# Patient Record
Sex: FEMALE | Race: WHITE | URBAN - NONMETROPOLITAN AREA
[De-identification: names, ages, dates, MRNs, and addresses within clinical notes are randomized per-mention and may not be internally consistent; named-entity substitution may affect disease eponyms.]

---

## 2022-02-16 ENCOUNTER — OFFICE VISIT (OUTPATIENT)
Dept: ENT CLINIC | Age: 62
End: 2022-02-16
Payer: MEDICAID

## 2022-02-16 VITALS
WEIGHT: 158 LBS | HEIGHT: 62 IN | SYSTOLIC BLOOD PRESSURE: 138 MMHG | BODY MASS INDEX: 29.08 KG/M2 | DIASTOLIC BLOOD PRESSURE: 80 MMHG

## 2022-02-16 DIAGNOSIS — E05.90 HYPERTHYROIDISM: ICD-10-CM

## 2022-02-16 DIAGNOSIS — E04.1 THYROID NODULE: Primary | ICD-10-CM

## 2022-02-16 DIAGNOSIS — R13.14 PHARYNGOESOPHAGEAL DYSPHAGIA: ICD-10-CM

## 2022-02-16 PROCEDURE — 31575 DIAGNOSTIC LARYNGOSCOPY: CPT | Performed by: PHYSICIAN ASSISTANT

## 2022-02-16 PROCEDURE — G8419 CALC BMI OUT NRM PARAM NOF/U: HCPCS | Performed by: PHYSICIAN ASSISTANT

## 2022-02-16 PROCEDURE — 99203 OFFICE O/P NEW LOW 30 MIN: CPT | Performed by: PHYSICIAN ASSISTANT

## 2022-02-16 PROCEDURE — G8484 FLU IMMUNIZE NO ADMIN: HCPCS | Performed by: PHYSICIAN ASSISTANT

## 2022-02-16 PROCEDURE — 4004F PT TOBACCO SCREEN RCVD TLK: CPT | Performed by: PHYSICIAN ASSISTANT

## 2022-02-16 PROCEDURE — G8427 DOCREV CUR MEDS BY ELIG CLIN: HCPCS | Performed by: PHYSICIAN ASSISTANT

## 2022-02-16 PROCEDURE — 3017F COLORECTAL CA SCREEN DOC REV: CPT | Performed by: PHYSICIAN ASSISTANT

## 2022-02-16 RX ORDER — HYDROCHLOROTHIAZIDE 12.5 MG/1
12.5 CAPSULE, GELATIN COATED ORAL DAILY
COMMUNITY

## 2022-02-16 RX ORDER — AMLODIPINE BESYLATE 10 MG/1
10 TABLET ORAL DAILY
COMMUNITY

## 2022-02-16 RX ORDER — PAROXETINE 10 MG/1
TABLET, FILM COATED ORAL
COMMUNITY
Start: 2022-02-13

## 2022-02-16 RX ORDER — METOPROLOL SUCCINATE 25 MG/1
25 TABLET, EXTENDED RELEASE ORAL DAILY
COMMUNITY

## 2022-02-16 RX ORDER — ALBUTEROL SULFATE 90 UG/1
2 AEROSOL, METERED RESPIRATORY (INHALATION) EVERY 6 HOURS PRN
COMMUNITY

## 2022-02-16 RX ORDER — PANTOPRAZOLE SODIUM 40 MG/1
TABLET, DELAYED RELEASE ORAL
COMMUNITY
Start: 2022-01-25

## 2022-02-16 RX ORDER — ATORVASTATIN CALCIUM 40 MG/1
40 TABLET, FILM COATED ORAL DAILY
COMMUNITY

## 2022-02-16 RX ORDER — CHOLECALCIFEROL (VITAMIN D3) 1250 MCG
CAPSULE ORAL
COMMUNITY

## 2022-02-16 RX ORDER — CLOPIDOGREL BISULFATE 75 MG/1
75 TABLET ORAL DAILY
COMMUNITY

## 2022-02-16 ASSESSMENT — ENCOUNTER SYMPTOMS
EYE PAIN: 0
SINUS PAIN: 0
EYE DISCHARGE: 0
SINUS PRESSURE: 0
TROUBLE SWALLOWING: 0
FACIAL SWELLING: 0
VOICE CHANGE: 1
SORE THROAT: 0
RHINORRHEA: 0

## 2022-02-16 NOTE — ASSESSMENT & PLAN NOTE
Hyperthyroidism based on current lab with history of thyroid dysfunction in the past requiring medication

## 2022-02-16 NOTE — ASSESSMENT & PLAN NOTE
Questionable thyroid nodule at the isthmus with evidence of thyroiditislikely from hyperthyroidism  Plan: I have recommended repeating an ultrasound in 6 months after the thyroid lab has been corrected. Overall I believe that the thyroiditis should resolve with the medications. We will defer her thyroid medicine to [facility] -her PCP.

## 2022-02-16 NOTE — ASSESSMENT & PLAN NOTE
Dysphagiaquestion secondary to possible esophageal stricture due to negative laryngoscopy with no extrinsic compression from thyroid

## 2022-02-16 NOTE — PROGRESS NOTES
[facility] OTOLARYNGOLOGY/ENT  Ms. Robert Herrera is a pleasant 79-year-old  female that was referred by [facility] due to problems with a questionable thyroid nodule and possible evidence of thyroiditis. The patient reports that she has been feeling very draggy with no energy. She reports that when she lived in [place] she was taking thyroid medicine and decided that she did not want to take it anymore. Patient underwent recent lab that demonstrated a decreased TSH level of less than 0.005. Free T4 was noted be within normal range at 1.79. Ultrasound of the thyroid was reviewed and demonstrated no obvious dominant nodules but was noted to be questionable for thyroiditis. Based on her lab work this is probably related to her thyroid disease. Due to the questionable nodule on the isthmus a follow-up was recommended. She denies any personal radiation exposure and denies a family history of thyroid cancer. She admits to a family history of thyroid dysfunction to include her sister. Patient also is noted with complaints of dysphagia that occurs sporadically with solid foods. She also admits to vocal hoarseness that she notices on a daily basis. She admits to smoking a pack of cigarettes/day for multiple years.       Allergies: Codeine      Current Outpatient Medications   Medication Sig Dispense Refill    metoprolol succinate (TOPROL XL) 25 MG extended release tablet Take 25 mg by mouth daily      pantoprazole (PROTONIX) 40 MG tablet       Cholecalciferol (VITAMIN D3) 1.25 MG (41638 UT) CAPS Take by mouth      PARoxetine (PAXIL) 10 MG tablet       hydroCHLOROthiazide (MICROZIDE) 12.5 MG capsule Take 12.5 mg by mouth daily      clopidogrel (PLAVIX) 75 MG tablet Take 75 mg by mouth daily      amLODIPine (NORVASC) 10 MG tablet Take 10 mg by mouth daily      atorvastatin (LIPITOR) 40 MG tablet Take 40 mg by mouth daily      tiotropium-olodaterol (STIOLTO RESPIMAT) 2.5-2.5 MCG/ACT AERS Inhale 2 puffs into the lungs daily      albuterol sulfate HFA (VENTOLIN HFA) 108 (90 Base) MCG/ACT inhaler Inhale 2 puffs into the lungs every 6 hours as needed for Wheezing       No current facility-administered medications for this visit. Past Surgical History:   Procedure Laterality Date    CHOLECYSTECTOMY         Past Medical History:   Diagnosis Date    [facility]est[facility]     Hyperlipidemia     Hypertension     Lung disease     [facility]        Family History   Problem Relation Age of Onset    Cancer Mother     Thyroid Disease Sister        Social History     Tobacco Use    Smoking status: Current Every Day Smoker     Packs/day: 0.50    Smokeless tobacco: Never Used   Substance Use Topics    Alcohol use: Not Currently           REVIEW OF SYSTEMS:  all other systems reviewed and are negative  Review of Systems   Constitutional: Negative for chills and fever. HENT: Positive for voice change. Negative for congestion, dental problem, ear discharge, ear pain, facial swelling, hearing loss, nosebleeds, postnasal drip, rhinorrhea, sinus pressure, sinus pain, sneezing, sore throat, tinnitus and trouble swallowing. Eyes: Negative for pain and discharge. Neurological: Negative for dizziness and headaches. Comments:     PHYSICAL EXAM:    /80   Ht 5' 2\" (1.575 m)   Wt 158 lb (71.7 kg)   BMI 28.90 kg/m²   Body mass index is 28.9 kg/m².     General Appearance: well developed  and well nourished  Head/ Face: normocephalic and atraumatic  Vocal Quality: good/ normal  Ears: Right Ear: External: external ears normal Otoscopy Ear Canal: canal clear Otoscopy TM: TM's normal and TM's mobile Left Ear: External: external ears normal Otoscopy Ear Canal: canal clear Otoscopy TM: TM's normal and TM's mobile  Hearing: grossly intact  Nose: see endoscopy report  Neck: supple and adenopathy none palpable  Thyroid: normal and nodules No thyroid tenderness was appreciated to epiglottis where the vocal cords were well visualized. The vocal cords were symmetrical and fully functional with no nodularities. The vocal cords were mildly edematous and erythematous and was felt to be secondary to mild reflux disease. Compression of the thyroid demonstrated no extrinsic compression to be present. The tongue was extended and demonstrated no lesions to the surface. No additional findings were appreciated. The patient tolerated the procedure nicely with no complications. No orders of the defined types were placed in this encounter. Electronically signed by Joe Reynoso PA-C on 2/16/22 at 1:17 PM CST        Please note that this chart was generated using dragon dictation software. Although every effort was made to ensure the accuracy of this automated transcription, some errors in transcription may have occurred.

## 2022-03-18 ENCOUNTER — TELEPHONE (OUTPATIENT)
Dept: VASCULAR SURGERY | Facility: CLINIC | Age: 62
End: 2022-03-18

## 2022-03-21 ENCOUNTER — OFFICE VISIT (OUTPATIENT)
Dept: VASCULAR SURGERY | Facility: CLINIC | Age: 62
End: 2022-03-21

## 2022-03-21 VITALS
DIASTOLIC BLOOD PRESSURE: 86 MMHG | SYSTOLIC BLOOD PRESSURE: 130 MMHG | WEIGHT: 151 LBS | BODY MASS INDEX: 27.79 KG/M2 | RESPIRATION RATE: 18 BRPM | HEIGHT: 62 IN

## 2022-03-21 DIAGNOSIS — I10 ESSENTIAL HYPERTENSION: ICD-10-CM

## 2022-03-21 DIAGNOSIS — I87.2 EDEMA OF BOTH LOWER LEGS DUE TO PERIPHERAL VENOUS INSUFFICIENCY: ICD-10-CM

## 2022-03-21 DIAGNOSIS — E78.2 MIXED HYPERLIPIDEMIA: ICD-10-CM

## 2022-03-21 DIAGNOSIS — F17.200 SMOKER UNMOTIVATED TO QUIT: ICD-10-CM

## 2022-03-21 DIAGNOSIS — R60.0 EDEMA OF BOTH LOWER LEGS DUE TO PERIPHERAL VENOUS INSUFFICIENCY: ICD-10-CM

## 2022-03-21 DIAGNOSIS — I87.2 EDEMA OF BOTH LOWER EXTREMITIES DUE TO PERIPHERAL VENOUS INSUFFICIENCY: Primary | ICD-10-CM

## 2022-03-21 PROBLEM — E05.90 HYPERTHYROIDISM: Status: ACTIVE | Noted: 2018-08-03

## 2022-03-21 PROBLEM — R13.14 PHARYNGOESOPHAGEAL DYSPHAGIA: Status: ACTIVE | Noted: 2022-02-16

## 2022-03-21 PROCEDURE — 99204 OFFICE O/P NEW MOD 45 MIN: CPT | Performed by: SURGERY

## 2022-03-21 RX ORDER — HYDROCHLOROTHIAZIDE 12.5 MG/1
12.5 TABLET ORAL DAILY
COMMUNITY
Start: 2022-03-14 | End: 2022-05-11

## 2022-03-21 RX ORDER — FUROSEMIDE 40 MG/1
40 TABLET ORAL 2 TIMES DAILY
COMMUNITY
Start: 2022-03-02

## 2022-03-21 RX ORDER — TIOTROPIUM BROMIDE AND OLODATEROL 3.124; 2.736 UG/1; UG/1
2 SPRAY, METERED RESPIRATORY (INHALATION) DAILY
COMMUNITY
Start: 2022-03-08 | End: 2022-12-06 | Stop reason: ALTCHOICE

## 2022-03-21 RX ORDER — CLOPIDOGREL BISULFATE 75 MG/1
1 TABLET ORAL DAILY
COMMUNITY
Start: 2022-03-14

## 2022-03-21 RX ORDER — ATORVASTATIN CALCIUM 40 MG/1
1 TABLET, FILM COATED ORAL NIGHTLY
COMMUNITY
Start: 2022-01-14

## 2022-03-21 RX ORDER — ALBUTEROL SULFATE 90 UG/1
2 AEROSOL, METERED RESPIRATORY (INHALATION) EVERY 6 HOURS PRN
COMMUNITY
End: 2022-12-06 | Stop reason: SDUPTHER

## 2022-03-21 RX ORDER — PAROXETINE 10 MG/1
1 TABLET, FILM COATED ORAL DAILY
COMMUNITY
Start: 2022-02-13

## 2022-03-21 RX ORDER — PANTOPRAZOLE SODIUM 40 MG/1
1 TABLET, DELAYED RELEASE ORAL DAILY
COMMUNITY
Start: 2022-01-25

## 2022-03-21 RX ORDER — POTASSIUM CHLORIDE 1500 MG/1
20 TABLET, FILM COATED, EXTENDED RELEASE ORAL DAILY
COMMUNITY
Start: 2022-03-16

## 2022-03-21 NOTE — PROGRESS NOTES
03/21/2022      Ruma Jenkins APRN  [address] [place] [address]    Lita Sparks  1960    Chief Complaint   Patient presents with   • Establish Care     Referral by Ruma NEGRON for Raynaud's Syndrome without gangrene.       Dear WILBER Flanagan*:      HPI  I had the pleasure of seeing your patient Lita Sparks in the office today.  Thank you kindly for this consultation.  As you recall, Lita Sparks is a 61 y.o.  female who you are currently following for general medical care.  She is referred to the vascular surgery office today for lower extremity edema and some venous congestion/purplish discoloration of the toes.  She has a history of COPD and CHF, as well as hypertension and hyperlipidemia.  She notes recently she has been having increasing shortness of breath with exertion and had previously been following with a cardiologist who had discussed possible cardiac cath but she never had it done.  She has been living here in [place] for about 7 months.  She actually now does have evaluation by our cardiology team here at [facility] in a couple of days.  She notes over the last several months she has been having worsening lower extremity edema that is worse with prolonged standing and somewhat improved with leg elevation and lying supine.  She also notes that her legs feel heavy with prolonged standing.  Finally she also notes developing some purplish discoloration to the toes of the bilateral feet especially with prolonged standing which is also improved with leg elevation and lying supine.  She denies any arterial symptoms with no claudication or ischemic rest pain.  She denies any prior history of DVT.  She has not really worn any compression in the past.  She has no other acute complaints today.    Past Medical History:   Diagnosis Date   • COPD (chronic obstructive pulmonary disease) ([facility])    • GERD (gastroesophageal reflux disease)    • Hyperlipemia    • Stroke ([facility])         Past Surgical History:   Procedure Laterality Date   • CHOLECYSTECTOMY         Family History   Problem Relation Age of Onset   • No Known Problems Mother    • No Known Problems Father        Social History     Socioeconomic History   • Marital status:    Tobacco Use   • Smoking status: Current Every Day Smoker     Packs/day: 1.00     Years: 47.00     Pack years: 47.00   • Smokeless tobacco: Never Used   • Tobacco comment: Pt has cut back to .25 pack per day.   Substance and Sexual Activity   • Alcohol use: Never   • Drug use: Never   • Sexual activity: Defer       Allergies   Allergen Reactions   • Codeine Other (See Comments)     Chest pain   • Methimazole Other (See Comments)     N/V       Prior to Admission medications    Medication Sig Start Date End Date Taking? Authorizing Provider   albuterol sulfate  (90 Base) MCG/ACT inhaler Inhale 2 puffs Every 6 (Six) Hours As Needed.   Yes Deonte Garcia MD   atorvastatin (LIPITOR) 40 MG tablet 1 tablet Every Night. 1/14/22  Yes Deonte Garcia MD   cholecalciferol (VITAMIN D3) 1.25 MG (83320 UT) capsule Take 1 capsule by mouth Every 7 (Seven) Days.   Yes Deonte Garcia MD   clopidogrel (PLAVIX) 75 MG tablet 1 tablet Daily. 3/14/22  Yes Deonte Garcia MD   furosemide (LASIX) 40 MG tablet Take 40 mg by mouth 2 (Two) Times a Day. 3/2/22  Yes Deonte Garcia MD   hydroCHLOROthiazide (HYDRODIURIL) 12.5 MG tablet Take 12.5 mg by mouth Daily. 3/14/22  Yes Deonte Garcia MD   metoprolol tartrate (LOPRESSOR) 25 MG tablet Take 12.5 mg by mouth Daily. 3/3/22  Yes Deonte Garcia MD   pantoprazole (PROTONIX) 40 MG EC tablet 1 tablet Daily. 1/25/22  Yes Deonte Garcia MD   PARoxetine (PAXIL) 10 MG tablet 1 tablet Daily. 2/13/22  Yes Deonet Garcia MD   potassium chloride ER (K-TAB) 20 MEQ tablet controlled-release ER tablet Take 20 mEq by mouth Daily. 3/16/22  Yes Provider, Historical, MD   Stiolto  "Respimat 2.5-2.5 MCG/ACT aerosol solution inhaler 2 puffs Daily. 3/8/22  Yes Provider, Historical, MD       Review of Systems   Constitutional: Negative.  Negative for activity change, appetite change, chills, diaphoresis, fatigue and fever.   HENT: Negative.  Negative for congestion, sneezing, sore throat and trouble swallowing.    Eyes: Negative.  Negative for visual disturbance.   Respiratory: Negative.  Negative for chest tightness and shortness of breath.    Cardiovascular: Positive for leg swelling. Negative for chest pain and palpitations.   Gastrointestinal: Negative.  Negative for abdominal distention, abdominal pain, nausea and vomiting.   Endocrine: Negative.    Genitourinary: Negative.    Musculoskeletal: Negative.    Skin: Negative.         She notes some purplish discoloration to the toes and distal forefeet bilaterally when her feet are in a dependent position.  This improves with leg elevation and lying supine.   Allergic/Immunologic: Negative.    Neurological: Negative.    Hematological: Negative.    Psychiatric/Behavioral: Negative.        /86 (BP Location: Left arm, Patient Position: Sitting, Cuff Size: Adult)   Resp 18   Ht 157.5 cm (62\")   Wt 68.5 kg (151 lb)   BMI 27.62 kg/m²   Physical Exam  Vitals reviewed.   Constitutional:       Appearance: Normal appearance.   HENT:      Head: Normocephalic and atraumatic.      Nose: Nose normal.      Mouth/Throat:      Mouth: Mucous membranes are moist.   Eyes:      Extraocular Movements: Extraocular movements intact.      Pupils: Pupils are equal, round, and reactive to light.   Cardiovascular:      Rate and Rhythm: Normal rate and regular rhythm.      Pulses:           Carotid pulses are 2+ on the right side and 2+ on the left side.       Radial pulses are 2+ on the right side and 2+ on the left side.        Femoral pulses are 2+ on the right side and 2+ on the left side.       Popliteal pulses are 2+ on the right side and 2+ on the left side. "        Dorsalis pedis pulses are 2+ on the right side and 2+ on the left side.        Posterior tibial pulses are 2+ on the right side and 2+ on the left side.      Comments: She has some edema of the bilateral lower extremities from ankles to knees.  She does have some venous congestion in the bilateral forefeet and toes.  In my office today this is worse when she sits with her feet dependent, and is improved with her legs elevated.  Pulmonary:      Effort: Pulmonary effort is normal. No respiratory distress.   Abdominal:      General: There is no distension.      Palpations: Abdomen is soft. There is no mass.      Tenderness: There is no abdominal tenderness.   Musculoskeletal:         General: Normal range of motion.      Cervical back: Normal range of motion and neck supple.      Right lower leg: Edema present.      Left lower leg: Edema present.   Skin:     General: Skin is warm and dry.      Capillary Refill: Capillary refill takes less than 2 seconds.   Neurological:      General: No focal deficit present.      Mental Status: She is alert and oriented to person, place, and time.   Psychiatric:         Mood and Affect: Mood normal.         Behavior: Behavior normal.         Thought Content: Thought content normal.         Judgment: Judgment normal.         No results found.    Patient Active Problem List   Diagnosis   • Cough   • Esophageal reflux   • Hyperthyroidism   • Arthritis   • Pharyngoesophageal dysphagia   • Smoker unmotivated to quit   • SOB (shortness of breath)   • Vaginitis and vulvovaginitis         ICD-10-CM ICD-9-CM   1. Edema of both lower extremities due to peripheral venous insufficiency  I87.2 459.81   2. Edema of both lower legs due to peripheral venous insufficiency   I87.2 459.81    R60.0 782.3   3. Essential hypertension  I10 401.9   4. Mixed hyperlipidemia  E78.2 272.2   5. Smoker unmotivated to quit  F17.200 305.1       Lab Frequency Next Occurrence   US Venous Doppler Lower Extremity  Bilateral (duplex) Once 06/19/2022       Plan: After thoroughly evaluating Lita Sparks, I believe the best course of action is to initially remain conservative from a vascular standpoint.  She clinically has evidence of lower extremity venous insufficiency with edema that is worse with prolonged standing and somewhat improved with leg elevation and lying supine.  She also has signs of venous congestion especially in the distal feet which is also worse with prolonged standing and improved with leg elevation and lying supine.  This is likely also exacerbated by CHF. I will give the patient a prescription for compression stockings in the 20-30 mm pressure gradient range.  I did instruct the patient on how to wear these on a daily basis.  I would like her to keep her legs elevated when the is not on them, and keep her legs well moisturized.  We will see the patient back in 3 months with a venous valvular insufficiency study. If the testing does show significant venous reflux, the patient may be a great candidate for endovenous closure as the patient's symptoms have significantly impacted their activities of daily living.  The patient can continue taking their current medication regimen as previously planned. I did discuss vascular risk factors as they pertain to the progression of vascular disease including controlling hypertension, and hyperlipidemia. These factors remain stable. Patient's Body mass index is 27.62 kg/m². indicating that she is overweight (BMI 25-29.9). Patient's (Body mass index is 27.62 kg/m².) indicates that they are overweight with health conditions that include hypertension, dyslipidemias and lower extremity venous stasis disease . Weight is newly identified. BMI is is above average; BMI management plan is completed. We discussed portion control and increasing exercise. This was all discussed in full with complete understanding.    Thank you for allowing me to participate in the care of your  patient.  Please do not hesitate with any questions or concerns.  I will keep you aware of any further encounters with Lita Sparks.        Sincerely yours,         Cade Ko MD

## 2022-03-23 ENCOUNTER — OFFICE VISIT (OUTPATIENT)
Dept: CARDIOLOGY | Facility: CLINIC | Age: 62
End: 2022-03-23

## 2022-03-23 VITALS
HEIGHT: 62 IN | HEART RATE: 99 BPM | SYSTOLIC BLOOD PRESSURE: 130 MMHG | OXYGEN SATURATION: 96 % | DIASTOLIC BLOOD PRESSURE: 90 MMHG | WEIGHT: 156 LBS | BODY MASS INDEX: 28.71 KG/M2

## 2022-03-23 DIAGNOSIS — E05.90 HYPERTHYROIDISM: ICD-10-CM

## 2022-03-23 DIAGNOSIS — Z71.6 TOBACCO ABUSE COUNSELING: ICD-10-CM

## 2022-03-23 DIAGNOSIS — R07.89 CHEST HEAVINESS: ICD-10-CM

## 2022-03-23 DIAGNOSIS — F17.200 SMOKER UNMOTIVATED TO QUIT: ICD-10-CM

## 2022-03-23 DIAGNOSIS — J41.0 SIMPLE CHRONIC BRONCHITIS: ICD-10-CM

## 2022-03-23 DIAGNOSIS — R06.02 SOB (SHORTNESS OF BREATH): Primary | ICD-10-CM

## 2022-03-23 DIAGNOSIS — E87.6 HYPOKALEMIA: ICD-10-CM

## 2022-03-23 DIAGNOSIS — G47.34 OXYGEN DESATURATION DURING SLEEP: ICD-10-CM

## 2022-03-23 DIAGNOSIS — I87.2 VENOUS INSUFFICIENCY: ICD-10-CM

## 2022-03-23 PROCEDURE — 93000 ELECTROCARDIOGRAM COMPLETE: CPT | Performed by: INTERNAL MEDICINE

## 2022-03-23 PROCEDURE — 99204 OFFICE O/P NEW MOD 45 MIN: CPT | Performed by: INTERNAL MEDICINE

## 2022-03-23 NOTE — PROGRESS NOTES
Lita Sparks  [id number]  1960  61 y.o.  female    Referring Provider: Ruma Jenkins APRN    Reason for  Visit:  Initial visit for shortness of breath and abnormal echo   Had echo in [facility]   Was told by Dr Sorenson needs heart cath   She does not understand why as did not get any details   Remote chemical stress test in [place] > 10 years ago, not sure of the results     Subjective    Moderate  chronic exertional shortness of breath on exertion relieved with rest  No significant cough or wheezing    No palpitations  Occasional chest heaviness   Lasts for less than 5 mins     Once or twice a week for > 3 years   Chest pain non pleuritic  Chest pain non positional and non gustatory     No significant pedal edema    No fever or chills  No significant expectoration    No hemoptysis  No presyncope or syncope    Tolerating current medications well with no untoward side effects   Compliant with prescribed medication regimen. Tries to adhere to cardiac diet.       History of present illness:  Lita Sparks is a 61 y.o. yo female with history congestive heart failure  who presents today for   Chief Complaint   Patient presents with   • Establish Care     Patient had a recent echo and [facility] and was told she needs to have a cath done    .    History  Past Medical History:   Diagnosis Date   • COPD (chronic obstructive pulmonary disease) (HCC)    • GERD (gastroesophageal reflux disease)    • Hyperlipemia    • Stroke (HCC)    ,   Past Surgical History:   Procedure Laterality Date   • CHOLECYSTECTOMY     ,   Family History   Problem Relation Age of Onset   • No Known Problems Mother    • No Known Problems Father    ,   Social History     Tobacco Use   • Smoking status: Current Every Day Smoker     Packs/day: 1.00     Years: 47.00     Pack years: 47.00   • Smokeless tobacco: Never Used   • Tobacco comment: Pt has cut back to .25 pack per day.   Substance Use Topics   • Alcohol use: Never   •  "Drug use: Never   ,     Medications  Current Outpatient Medications   Medication Sig Dispense Refill   • albuterol sulfate  (90 Base) MCG/ACT inhaler Inhale 2 puffs Every 6 (Six) Hours As Needed.     • atorvastatin (LIPITOR) 40 MG tablet 1 tablet Every Night.     • cholecalciferol (VITAMIN D3) 1.25 MG (50849 UT) capsule Take 1 capsule by mouth Every 7 (Seven) Days.     • clopidogrel (PLAVIX) 75 MG tablet 1 tablet Daily.     • furosemide (LASIX) 40 MG tablet Take 40 mg by mouth 2 (Two) Times a Day.     • hydroCHLOROthiazide (HYDRODIURIL) 12.5 MG tablet Take 12.5 mg by mouth Daily.     • metoprolol tartrate (LOPRESSOR) 25 MG tablet Take 12.5 mg by mouth Daily.     • pantoprazole (PROTONIX) 40 MG EC tablet 1 tablet Daily.     • PARoxetine (PAXIL) 10 MG tablet 1 tablet Daily.     • potassium chloride ER (K-TAB) 20 MEQ tablet controlled-release ER tablet Take 20 mEq by mouth Daily.     • Stiolto Respimat 2.5-2.5 MCG/ACT aerosol solution inhaler 2 puffs Daily.       No current facility-administered medications for this visit.       Allergies:  Codeine and Methimazole    Review of Systems  Review of Systems   Constitutional: Negative.   HENT: Negative.    Eyes: Negative.    Cardiovascular: Positive for dyspnea on exertion. Negative for chest pain, claudication, cyanosis, irregular heartbeat, leg swelling, near-syncope, orthopnea, palpitations, paroxysmal nocturnal dyspnea and syncope.   Respiratory: Negative.    Endocrine: Negative.    Hematologic/Lymphatic: Negative.    Skin: Negative.    Musculoskeletal: Positive for arthritis, back pain and joint pain.   Gastrointestinal: Negative for anorexia.   Genitourinary: Negative.    Neurological: Negative.    Psychiatric/Behavioral: Negative.        Objective     Physical Exam:  /90   Pulse 99   Ht 157.5 cm (62\")   Wt 70.8 kg (156 lb)   SpO2 96%   BMI 28.53 kg/m²     Physical Exam  Constitutional:       Appearance: Normal appearance.   HENT:      Head: " Normocephalic.   Eyes:      General: Lids are normal.   Neck:      Vascular: No carotid bruit.   Cardiovascular:      Rate and Rhythm: Regular rhythm.      Heart sounds: S1 normal and S2 normal. Murmur heard.    Systolic murmur is present with a grade of 2/6.  Pulmonary:      Effort: Pulmonary effort is normal.   Abdominal:      Palpations: Abdomen is soft.   Neurological:      Mental Status: She is alert.   Psychiatric:         Speech: Speech normal.         Behavior: Behavior normal.         Thought Content: Thought content normal.         Results Review:     ____________________________________________________________________________________________________________________________________________  Health maintenance and recommendations    Low salt/ HTN/ Heart healthy carbohydrate restricted cardiac diet   The patient is advised to reduce or avoid caffeine or other cardiac stimulants.   Minimize or avoid  NSAID-type medications      Monitor for any signs of bleeding including red or dark stools. Fall precautions.   Advised staying uptodate with immunizations per established standard guidelines.    Offered to give patient  a copy of my notes     Questions were encouraged, asked and answered to the patient's  understanding and satisfaction. Questions if any regarding current medications and side effects, need for refills and importance of compliance to medications stressed.    Reviewed available prior notes, consults, prior visits, laboratory findings, radiology and cardiology relevant reports. Updated chart as applicable. I have reviewed the patient's medical history in detail and updated the computerized patient record as relevant.      Updated patient regarding any new or relevant abnormalities on review of records or any new findings on physical exam. Mentioned to patient about purpose of visit and desirable health short and long term goals and objectives.    Primary to monitor CBC CMP Lipid panel and TSH as  applicable    ___________________________________________________________________________________________________________________________________________     ECG 12 Lead    Date/Time: 3/23/2022 8:16 AM  Performed by: Tacho Aguilar MD  Authorized by: Tacho Aguilar MD   Comparison: not compared with previous ECG   Rhythm: sinus rhythm  Rate: normal  Conduction: incomplete right bundle branch block  QRS axis: left    Clinical impression: abnormal EKG            Assessment/Plan   Diagnoses and all orders for this visit:    1. SOB (shortness of breath) (Primary)    2. Smoker unmotivated to quit    3. Hyperthyroidism    4. Chest heaviness    5. Oxygen desaturation during sleep    6. Simple chronic bronchitis (HCC)    7. Tobacco abuse counseling    8. Hypokalemia on potassium and due for repeat labs    9. Venous insufficiency    Other orders  -     ECG 12 Lead          Plan    Will get echo results from [facility]   Further recommendations pending results of echo     Continue same medications for now   Continue diuretics and beta blocker     Due to see endocrinologist in May     Check BP and heart rates twice daily initially till blood pressures and heart rates under good control and then at least 3x / week,   If blood pressures continue to be well-controlled then can check week a month  at home and bring a recording for review next visit  If BP >130/85 or < 100/60 persistently over 3 reading 30 mins apart or if heart rates persistently above 100 bpm or less than 55 bpm call sooner for evaluation and advise     Weigh yourself frequently, at least weekly, preferably daily, call me if more than 2 pounds a day or 5 pounds a week weight gain.  Flexible diuretic dosing     I support the patient's decision to take the Covid -19 vaccine   Had required complete course   No major issues   Now fully immunized    Recommend booster             Return in about 4 weeks (around 4/20/2022).

## 2022-03-25 ENCOUNTER — TELEPHONE (OUTPATIENT)
Dept: CARDIOLOGY | Facility: CLINIC | Age: 62
End: 2022-03-25

## 2022-04-29 ENCOUNTER — TELEPHONE (OUTPATIENT)
Dept: VASCULAR SURGERY | Facility: CLINIC | Age: 62
End: 2022-04-29

## 2022-04-29 NOTE — TELEPHONE ENCOUNTER
Dr. Ko will be out of the office on 6/22/22, so we had to change the patient's appts.  I changed her appts and sent a letter to her letting her know when her appt was scheduled.

## 2022-05-11 ENCOUNTER — OFFICE VISIT (OUTPATIENT)
Dept: CARDIOLOGY | Facility: CLINIC | Age: 62
End: 2022-05-11

## 2022-05-11 VITALS
SYSTOLIC BLOOD PRESSURE: 150 MMHG | HEART RATE: 52 BPM | DIASTOLIC BLOOD PRESSURE: 98 MMHG | WEIGHT: 158 LBS | HEIGHT: 62 IN | BODY MASS INDEX: 29.08 KG/M2 | OXYGEN SATURATION: 100 %

## 2022-05-11 DIAGNOSIS — E78.2 MIXED HYPERLIPIDEMIA: ICD-10-CM

## 2022-05-11 DIAGNOSIS — E05.90 HYPERTHYROIDISM: ICD-10-CM

## 2022-05-11 DIAGNOSIS — I10 PRIMARY HYPERTENSION: ICD-10-CM

## 2022-05-11 DIAGNOSIS — F17.200 SMOKER UNMOTIVATED TO QUIT: ICD-10-CM

## 2022-05-11 DIAGNOSIS — J41.0 SIMPLE CHRONIC BRONCHITIS: ICD-10-CM

## 2022-05-11 DIAGNOSIS — I36.1 NONRHEUMATIC TRICUSPID VALVE REGURGITATION: Primary | ICD-10-CM

## 2022-05-11 DIAGNOSIS — R09.89 SUSPECTED CONGESTIVE HEART FAILURE: ICD-10-CM

## 2022-05-11 PROCEDURE — 99214 OFFICE O/P EST MOD 30 MIN: CPT | Performed by: NURSE PRACTITIONER

## 2022-05-11 RX ORDER — LOSARTAN POTASSIUM 25 MG/1
25 TABLET ORAL DAILY
Qty: 30 TABLET | Refills: 11 | Status: SHIPPED | OUTPATIENT
Start: 2022-05-11 | End: 2023-01-18 | Stop reason: SDUPTHER

## 2022-05-11 NOTE — PROGRESS NOTES
Chief Complaint  Shortness of Breath and Results (Echo done at [facility] [place])    Subjective          Lita Sparks presents to [facility] for routine follow-up.  She was last seen in our office on 3/23/2022 as a new patient referred by Dr. Sorenson for potential left heart catheterization based on results of a 2D echo done at [facility] 2/23/22.  She has had a cerebrovascular accident in the past.  She has hypertension, hyperlipidemia, COPD, GERD, hyperthyroidism and tobacco use. She complains of progressively worsening dyspnea with mild exertion. She reports chronic orthopnea requiring two pillows to sleep. She reports chronic bilateral lower extremity edema as well. She reports intermittent palpitations and dizziness. Patient denies chest pain, syncope, PND or decreased stamina.  Patient denies any signs of bleeding.    Hypertension  This is a chronic problem. The current episode started more than 1 year ago. The problem is uncontrolled. Associated symptoms include orthopnea, palpitations, peripheral edema and shortness of breath. Pertinent negatives include no anxiety, blurred vision, chest pain, headaches, malaise/fatigue, neck pain, PND or sweats. Risk factors for coronary artery disease include post-menopausal state, dyslipidemia and smoking/tobacco exposure. Current antihypertension treatment includes diuretics and beta blockers. The current treatment provides significant improvement. Hypertensive end-organ damage includes CVA. Identifiable causes of hypertension include a thyroid problem.   Hyperlipidemia  This is a chronic problem. The current episode started more than 1 year ago. Associated symptoms include shortness of breath. Pertinent negatives include no chest pain. Current antihyperlipidemic treatment includes statins. Risk factors for coronary artery disease include post-menopausal, hypertension and dyslipidemia.       Objective   Vital Signs:   /98    "Pulse 52   Ht 157.5 cm (62\")   Wt 71.7 kg (158 lb)   SpO2 100%   BMI 28.90 kg/m²     Vitals and nursing note reviewed.   Constitutional:       General: Not in acute distress.     Appearance: Normal and healthy appearance. Well-developed, overweight and not in distress. Not diaphoretic.   Eyes:      General: Lids are normal.         Right eye: No discharge.         Left eye: No discharge.      Conjunctiva/sclera: Conjunctivae normal.      Pupils: Pupils are equal, round, and reactive to light.   HENT:      Head: Normocephalic and atraumatic.      Jaw: There is normal jaw occlusion.      Right Ear: External ear normal.      Left Ear: External ear normal.      Nose: Nose normal.   Neck:      Thyroid: No thyromegaly.      Vascular: No carotid bruit, JVD or JVR. JVD normal.      Trachea: Trachea normal. No tracheal deviation.   Pulmonary:      Effort: Pulmonary effort is normal. No respiratory distress.      Breath sounds: Examination of the left-lower field reveals rales. No decreased breath sounds. No wheezing. No rhonchi. Rales present.   Chest:      Chest wall: Not tender to palpatation.   Cardiovascular:      PMI at left midclavicular line. Normal rate. Regular rhythm. Normal S1. Normal S2.      Murmurs: There is a systolic murmur. There is a diastolic murmur.      No gallop. No click. No rub.   Pulses:     Intact distal pulses. No decreased pulses.   Edema:     Peripheral edema present.     Pretibial: bilateral 1+ pitting edema of the pretibial area.     Ankle: bilateral 1+ pitting edema of the ankle.     Feet: bilateral 1+ pitting edema of the feet.  Abdominal:      General: Bowel sounds are normal. There is no distension.      Palpations: Abdomen is soft.      Tenderness: There is no abdominal tenderness.   Musculoskeletal: Normal range of motion.         General: No tenderness or deformity.      Cervical back: Normal range of motion and neck supple. Skin:     General: Skin is warm and dry.      Coloration: " Skin is not pale.      Findings: No erythema or rash.   Neurological:      General: No focal deficit present.      Mental Status: Alert, oriented to person, place, and time and oriented to person, place and time.   Psychiatric:         Attention and Perception: Attention and perception normal.         Mood and Affect: Mood and affect normal.         Speech: Speech normal.         Behavior: Behavior normal.         Thought Content: Thought content normal.         Cognition and Memory: Cognition and memory normal.         Judgment: Judgment normal.        Result Review :   The following data was reviewed by: JAMIL Avlarez on 05/11/2022:      Data reviewed: Cardiology studies 2d echo 2/23/22 [place] [facility] [place]          Assessment and Plan    Diagnoses and all orders for this visit:    1. Nonrheumatic tricuspid valve regurgitation (Primary)- check limited 2d echo. Will attempt to get disc from echo at [facility] [place] for comparison.     2. Primary hypertension-blood pressure is markedly elevated in office today. Pt reports consistently higher than 130/90 at home. Start losartan 25 mg daily. Continue metoprolol tartrate.  Monitor and record daily blood pressure. Report readings consistently higher than 130/90 or consistently lower than 100/60.     3. Mixed hyperlipidemia- management per PCP.  Continue atorvastatin.    4. Hyperthyroidism-patient follows with endocrinology.  Stable.    5. Simple chronic bronchitis (HCC)-stable on room air.    6. Smoker unmotivated to quit- Lita Sparks  reports that she has been smoking. She has a 23.50 pack-year smoking history. She has never used smokeless tobacco.. I have educated her on the risk of diseases from using tobacco products such as cancer, COPD and heart disease. I advised her to quit and she is not willing to quit. I spent 3  minutes counseling the patient.    7. Suspected congestive heart failure- pt states she has been told in the past that she had congestive heart failure. Normal  EF on recent echo with no mention of diastolic dysfunction. Right ventricle is noted to be mildly dilated, however function is noted to be normal. Continue lasix for now. If repeat echo does in fact reveal CHF, pt would benefit for medication adjustments to align with GDMT of CHF.         Follow Up   Return in about 4 weeks (around 6/8/2022) for Next scheduled follow up.  Patient was given instructions and counseling regarding her condition or for health maintenance advice. Please see specific information pulled into the AVS if appropriate.

## 2022-06-17 ENCOUNTER — TELEPHONE (OUTPATIENT)
Dept: VASCULAR SURGERY | Facility: CLINIC | Age: 62
End: 2022-06-17

## 2022-06-17 NOTE — TELEPHONE ENCOUNTER
Confirmed appts with pt for Monday, 6/20/22, [facility] for US at 8:00 and f/u with Dr Ko at 2:45.

## 2022-06-20 ENCOUNTER — HOSPITAL ENCOUNTER (OUTPATIENT)
Dept: ULTRASOUND IMAGING | Facility: HOSPITAL | Age: 62
Discharge: HOME | End: 2022-06-20
Admitting: SURGERY

## 2022-06-20 ENCOUNTER — OFFICE VISIT (OUTPATIENT)
Dept: VASCULAR SURGERY | Facility: CLINIC | Age: 62
End: 2022-06-20

## 2022-06-20 VITALS
OXYGEN SATURATION: 97 % | HEART RATE: 94 BPM | RESPIRATION RATE: 18 BRPM | DIASTOLIC BLOOD PRESSURE: 72 MMHG | WEIGHT: 153 LBS | HEIGHT: 62 IN | BODY MASS INDEX: 28.16 KG/M2 | SYSTOLIC BLOOD PRESSURE: 90 MMHG

## 2022-06-20 DIAGNOSIS — I87.2 EDEMA OF BOTH LOWER LEGS DUE TO PERIPHERAL VENOUS INSUFFICIENCY: ICD-10-CM

## 2022-06-20 DIAGNOSIS — I83.11 VARICOSE VEINS OF BOTH LOWER EXTREMITIES WITH INFLAMMATION: ICD-10-CM

## 2022-06-20 DIAGNOSIS — I87.2 EDEMA OF BOTH LOWER EXTREMITIES DUE TO PERIPHERAL VENOUS INSUFFICIENCY: ICD-10-CM

## 2022-06-20 DIAGNOSIS — R60.0 EDEMA OF BOTH LOWER LEGS DUE TO PERIPHERAL VENOUS INSUFFICIENCY: ICD-10-CM

## 2022-06-20 DIAGNOSIS — E78.2 MIXED HYPERLIPIDEMIA: ICD-10-CM

## 2022-06-20 DIAGNOSIS — I83.12 VARICOSE VEINS OF BOTH LOWER EXTREMITIES WITH INFLAMMATION: ICD-10-CM

## 2022-06-20 DIAGNOSIS — I10 ESSENTIAL HYPERTENSION: ICD-10-CM

## 2022-06-20 DIAGNOSIS — I87.2 VENOUS INSUFFICIENCY OF BOTH LOWER EXTREMITIES: Primary | ICD-10-CM

## 2022-06-20 PROCEDURE — 93970 EXTREMITY STUDY: CPT

## 2022-06-20 PROCEDURE — 93970 EXTREMITY STUDY: CPT | Performed by: SURGERY

## 2022-06-20 PROCEDURE — 99214 OFFICE O/P EST MOD 30 MIN: CPT | Performed by: SURGERY

## 2022-06-20 RX ORDER — PROMETHAZINE HYDROCHLORIDE 25 MG/1
1 TABLET ORAL AS NEEDED
COMMUNITY
Start: 2022-04-19

## 2022-06-20 RX ORDER — AMLODIPINE BESYLATE 10 MG/1
10 TABLET ORAL DAILY
COMMUNITY
Start: 2022-03-23

## 2022-06-20 NOTE — H&P
HPI     I had the pleasure of seeing your patient in the office today for follow up.  As you recall, the patient is a 61 y.o. female who we are currently following for lower extremity edema and clinical venous insufficiency.  She returns today after having undergone venous duplex with insufficiency study for further evaluation.  I did personally review this in the office.  It does show evidence of significant reflux in the bilateral greater saphenous veins.  There was also incidentally noted to be a Baker's cyst in the right popliteal fossa.  Since her last visit she has been wearing compression and using leg elevation on a daily basis over the last 3 months as well as using leg elevation and exercise.  However despite this she continues to have significant reflux symptoms with edema, heaviness, and discomfort.  She also continues to have venous congestion in the bilateral feet with her legs in a dependent position.  She denies any arterial symptoms with no claudication or ischemic rest pain.  She has no other acute complaints..      Review of Systems   Constitutional: Negative.  Negative for activity change, appetite change, chills, diaphoresis, fatigue and fever.   HENT: Negative.  Negative for congestion, sneezing, sore throat and trouble swallowing.    Eyes: Negative.  Negative for visual disturbance.   Respiratory: Negative.  Negative for chest tightness and shortness of breath.    Cardiovascular: Positive for leg swelling. Negative for chest pain and palpitations.   Gastrointestinal: Negative.  Negative for abdominal distention, abdominal pain, nausea and vomiting.   Endocrine: Negative.    Genitourinary: Negative.    Musculoskeletal: Negative.    Skin: Negative.         She notes some purplish discoloration to the toes and distal forefeet bilaterally when her feet are in a dependent position.  This improves with leg elevation and lying supine.   Allergic/Immunologic: Negative.    Neurological: Negative.   "  Hematological: Negative.    Psychiatric/Behavioral: Negative.        BP 90/72 (BP Location: Left arm, Patient Position: Sitting, Cuff Size: Adult)   Pulse 94   Resp 18   Ht 157.5 cm (62\")   Wt 69.4 kg (153 lb)   SpO2 97%   BMI 27.98 kg/m²   Physical Exam  Vitals reviewed.   Constitutional:       Appearance: Normal appearance.   HENT:      Head: Normocephalic and atraumatic.      Nose: Nose normal.      Mouth/Throat:      Mouth: Mucous membranes are moist.   Eyes:      Extraocular Movements: Extraocular movements intact.      Pupils: Pupils are equal, round, and reactive to light.   Cardiovascular:      Rate and Rhythm: Normal rate and regular rhythm.      Pulses:           Carotid pulses are 2+ on the right side and 2+ on the left side.       Radial pulses are 2+ on the right side and 2+ on the left side.        Femoral pulses are 2+ on the right side and 2+ on the left side.       Popliteal pulses are 2+ on the right side and 2+ on the left side.        Dorsalis pedis pulses are 2+ on the right side and 2+ on the left side.        Posterior tibial pulses are 2+ on the right side and 2+ on the left side.      Comments: She has some edema of the bilateral lower extremities from ankles to knees.  She does have some venous congestion in the bilateral forefeet and toes.  In my office today this is worse when she sits with her feet dependent, and is improved with her legs elevated.  Pulmonary:      Effort: Pulmonary effort is normal. No respiratory distress.   Abdominal:      General: There is no distension.      Palpations: Abdomen is soft. There is no mass.      Tenderness: There is no abdominal tenderness.   Musculoskeletal:         General: Normal range of motion.      Cervical back: Normal range of motion and neck supple.      Right lower leg: Edema present.      Left lower leg: Edema present.   Skin:     General: Skin is warm and dry.      Capillary Refill: Capillary refill takes less than 2 seconds. "   Neurological:      General: No focal deficit present.      Mental Status: She is alert and oriented to person, place, and time.   Psychiatric:         Mood and Affect: Mood normal.         Behavior: Behavior normal.         Thought Content: Thought content normal.         Judgment: Judgment normal.         DIAGNOSTIC DATA:        US Venous Doppler Lower Extremity Bilateral (duplex)    Result Date: 6/20/2022  Narrative: History: Swelling   Comments: Venous valvular insufficiency testing was performed in the bilateral lower extremities using duplex ultrasound with compression techniques.  The common femoral vein, superficial femoral, popliteal vein, posterior tibial vein, peroneal vein, greater saphenous vein, and lesser saphenous veins were interrogated.  On the right, the greater saphenous vein at the junction measured 7mm. In the mid thigh measured 3.1mm. Above the knee measured 4.1mm. In the mid calf measured 2.9mm. At the ankle measured 3.1mm. There is greater than 0.5 seconds of reflux from the junction to the mid thigh. The lesser saphenous vein is within normal limits and no evidence of reflux. There is no evidence of DVT. There is a Baker's cyst in the popliteal space measuring 4.9 x 0.8 x 1.67 cm.  On the left, the greater saphenous vein at the junction measured 7.2mm. In the mid thigh measured 4.1mm. Above the knee measured 3.3mm. In the mid calf measured 2.6mm. At the ankle measured 2.7mm. There is greater than 0.5 seconds of reflux at the junction.. The lesser saphenous vein is within normal limits and no evidence of reflux. There is no evidence of DVT.      Impression: Impression: There is evidence of venous insufficiency in both lower extremity greater saphenous veins. There is also a Baker's cyst in the right popliteal space with measurements above.   This report was finalized on 06/20/2022 13:14 by Dr. Harvinder Morgan MD.      Patient Active Problem List   Diagnosis   • Cough   • Esophageal reflux    • Hyperthyroidism   • Arthritis   • Pharyngoesophageal dysphagia   • Smoker unmotivated to quit   • KWAN (dyspnea on exertion)   • Vaginitis and vulvovaginitis   • Chest heaviness   • Oxygen desaturation during sleep   • Simple chronic bronchitis (HCC)   • Tobacco abuse counseling   • Hypokalemia on potassium and due for repeat labs   • Venous insufficiency   • Primary hypertension   • Mixed hyperlipidemia   • Nonrheumatic tricuspid valve regurgitation   • Suspected congestive heart failure         ICD-10-CM ICD-9-CM   1. Venous insufficiency of both lower extremities  I87.2 459.81   2. Varicose veins of both lower extremities with inflammation  I83.11 454.1    I83.12    3. Essential hypertension  I10 401.9   4. Mixed hyperlipidemia  E78.2 272.2       Lab Frequency Next Occurrence   Adult Transthoracic Echo Limited W/ Cont if Necessary Per Protocol Once 05/18/2022       PLAN: After thoroughly evaluating Lita Marietta, I believe the best course of action is to proceed with right lower extremity greater saphenous vein radiofrequency ablation.  Patient returns today after having undergone venous duplex with insufficiency study which I did personally review.  It does show evidence of significant reflux in the bilateral greater saphenous veins.  Despite the use of compression, leg elevation, and exercise over the last 3 months she continues to have significant reflux, right slightly worse than left.  Given these findings I think she would benefit initially from right lower extremity greater saphenous vein radiofrequency ablation. Risks of radiofrequency ablation include, but are not limited to, bleeding, infection, vessel damage, nerve damage, DVT, phlebitis, and pulmonary embolus.  The patient understands these risks and wishes to proceed with procedure.  The patient is to continue taking their medications as previously discussed.   I did discuss vascular risk factors as they pertain to the progression of vascular  disease including controlling pretension, and hyperlipidemia. These factors remain stable. BMI is >= 25 and <30. (Overweight) The following options were offered after discussion;: exercise counseling/recommendations and nutrition counseling/recommendations. This was all discussed in full with complete understanding.

## 2022-06-20 NOTE — PROGRESS NOTES
06/20/2022      Ruma Jenkins, JAMIL  [address] [facility] [address]        Lita Sparks  1960    Chief Complaint   Patient presents with   • Peripheral Vascular Disease     3 month follow-up with US pad venous lower ext bilateral.  Pt states she is having swelling in both legs with pain.  Pt states she wears compression daily and elevates legs.  Pt denies any stroke-like symptoms.       Dear Ruma Jenkins APRN:    HPI     I had the pleasure of seeing your patient in the office today for follow up.  As you recall, the patient is a 61 y.o. female who we are currently following for lower extremity edema and clinical venous insufficiency.  She returns today after having undergone venous duplex with insufficiency study for further evaluation.  I did personally review this in the office.  It does show evidence of significant reflux in the bilateral greater saphenous veins.  There was also incidentally noted to be a Baker's cyst in the right popliteal fossa.  Since her last visit she has been wearing compression and using leg elevation on a daily basis over the last 3 months as well as using leg elevation and exercise.  However despite this she continues to have significant reflux symptoms with edema, heaviness, and discomfort.  She also continues to have venous congestion in the bilateral feet with her legs in a dependent position.  She denies any arterial symptoms with no claudication or ischemic rest pain.  She has no other acute complaints..      Review of Systems   Constitutional: Negative.  Negative for activity change, appetite change, chills, diaphoresis, fatigue and fever.   HENT: Negative.  Negative for congestion, sneezing, sore throat and trouble swallowing.    Eyes: Negative.  Negative for visual disturbance.   Respiratory: Negative.  Negative for chest tightness and shortness of breath.    Cardiovascular: Positive for leg swelling. Negative for chest pain and palpitations.  "  Gastrointestinal: Negative.  Negative for abdominal distention, abdominal pain, nausea and vomiting.   Endocrine: Negative.    Genitourinary: Negative.    Musculoskeletal: Negative.    Skin: Negative.         She notes some purplish discoloration to the toes and distal forefeet bilaterally when her feet are in a dependent position.  This improves with leg elevation and lying supine.   Allergic/Immunologic: Negative.    Neurological: Negative.    Hematological: Negative.    Psychiatric/Behavioral: Negative.        BP 90/72 (BP Location: Left arm, Patient Position: Sitting, Cuff Size: Adult)   Pulse 94   Resp 18   Ht 157.5 cm (62\")   Wt 69.4 kg (153 lb)   SpO2 97%   BMI 27.98 kg/m²   Physical Exam  Vitals reviewed.   Constitutional:       Appearance: Normal appearance.   HENT:      Head: Normocephalic and atraumatic.      Nose: Nose normal.      Mouth/Throat:      Mouth: Mucous membranes are moist.   Eyes:      Extraocular Movements: Extraocular movements intact.      Pupils: Pupils are equal, round, and reactive to light.   Cardiovascular:      Rate and Rhythm: Normal rate and regular rhythm.      Pulses:           Carotid pulses are 2+ on the right side and 2+ on the left side.       Radial pulses are 2+ on the right side and 2+ on the left side.        Femoral pulses are 2+ on the right side and 2+ on the left side.       Popliteal pulses are 2+ on the right side and 2+ on the left side.        Dorsalis pedis pulses are 2+ on the right side and 2+ on the left side.        Posterior tibial pulses are 2+ on the right side and 2+ on the left side.      Comments: She has some edema of the bilateral lower extremities from ankles to knees.  She does have some venous congestion in the bilateral forefeet and toes.  In my office today this is worse when she sits with her feet dependent, and is improved with her legs elevated.  Pulmonary:      Effort: Pulmonary effort is normal. No respiratory distress.   Abdominal: "      General: There is no distension.      Palpations: Abdomen is soft. There is no mass.      Tenderness: There is no abdominal tenderness.   Musculoskeletal:         General: Normal range of motion.      Cervical back: Normal range of motion and neck supple.      Right lower leg: Edema present.      Left lower leg: Edema present.   Skin:     General: Skin is warm and dry.      Capillary Refill: Capillary refill takes less than 2 seconds.   Neurological:      General: No focal deficit present.      Mental Status: She is alert and oriented to person, place, and time.   Psychiatric:         Mood and Affect: Mood normal.         Behavior: Behavior normal.         Thought Content: Thought content normal.         Judgment: Judgment normal.         DIAGNOSTIC DATA:        US Venous Doppler Lower Extremity Bilateral (duplex)    Result Date: 6/20/2022  Narrative: History: Swelling   Comments: Venous valvular insufficiency testing was performed in the bilateral lower extremities using duplex ultrasound with compression techniques.  The common femoral vein, superficial femoral, popliteal vein, posterior tibial vein, peroneal vein, greater saphenous vein, and lesser saphenous veins were interrogated.  On the right, the greater saphenous vein at the junction measured 7mm. In the mid thigh measured 3.1mm. Above the knee measured 4.1mm. In the mid calf measured 2.9mm. At the ankle measured 3.1mm. There is greater than 0.5 seconds of reflux from the junction to the mid thigh. The lesser saphenous vein is within normal limits and no evidence of reflux. There is no evidence of DVT. There is a Baker's cyst in the popliteal space measuring 4.9 x 0.8 x 1.67 cm.  On the left, the greater saphenous vein at the junction measured 7.2mm. In the mid thigh measured 4.1mm. Above the knee measured 3.3mm. In the mid calf measured 2.6mm. At the ankle measured 2.7mm. There is greater than 0.5 seconds of reflux at the junction.. The lesser  saphenous vein is within normal limits and no evidence of reflux. There is no evidence of DVT.      Impression: Impression: There is evidence of venous insufficiency in both lower extremity greater saphenous veins. There is also a Baker's cyst in the right popliteal space with measurements above.   This report was finalized on 06/20/2022 13:14 by Dr. Harvinder Morgan MD.      Patient Active Problem List   Diagnosis   • Cough   • Esophageal reflux   • Hyperthyroidism   • Arthritis   • Pharyngoesophageal dysphagia   • Smoker unmotivated to quit   • KWAN (dyspnea on exertion)   • Vaginitis and vulvovaginitis   • Chest heaviness   • Oxygen desaturation during sleep   • Simple chronic bronchitis (HCC)   • Tobacco abuse counseling   • Hypokalemia on potassium and due for repeat labs   • Venous insufficiency   • Primary hypertension   • Mixed hyperlipidemia   • Nonrheumatic tricuspid valve regurgitation   • Suspected congestive heart failure         ICD-10-CM ICD-9-CM   1. Venous insufficiency of both lower extremities  I87.2 459.81   2. Varicose veins of both lower extremities with inflammation  I83.11 454.1    I83.12    3. Essential hypertension  I10 401.9   4. Mixed hyperlipidemia  E78.2 272.2       Lab Frequency Next Occurrence   Adult Transthoracic Echo Limited W/ Cont if Necessary Per Protocol Once 05/18/2022       PLAN: After thoroughly evaluating Lita Lee, I believe the best course of action is to proceed with right lower extremity greater saphenous vein radiofrequency ablation.  Patient returns today after having undergone venous duplex with insufficiency study which I did personally review.  It does show evidence of significant reflux in the bilateral greater saphenous veins.  Despite the use of compression, leg elevation, and exercise over the last 3 months she continues to have significant reflux, right slightly worse than left.  Given these findings I think she would benefit initially from right lower  extremity greater saphenous vein radiofrequency ablation. Risks of radiofrequency ablation include, but are not limited to, bleeding, infection, vessel damage, nerve damage, DVT, phlebitis, and pulmonary embolus.  The patient understands these risks and wishes to proceed with procedure.  The patient is to continue taking their medications as previously discussed.   I did discuss vascular risk factors as they pertain to the progression of vascular disease including controlling pretension, and hyperlipidemia. These factors remain stable. BMI is >= 25 and <30. (Overweight) The following options were offered after discussion;: exercise counseling/recommendations and nutrition counseling/recommendations. This was all discussed in full with complete understanding.  Thank you for allowing me to participate in the care of your patient.  Please do not hesitate to call with any questions or concerns.  We will keep you aware of any further encounters with Lita Bridger.      Sincerely Yours,      Cade Ko MD

## 2022-06-22 ENCOUNTER — APPOINTMENT (OUTPATIENT)
Dept: ULTRASOUND IMAGING | Facility: HOSPITAL | Age: 62
End: 2022-06-22

## 2022-06-22 ENCOUNTER — TELEPHONE (OUTPATIENT)
Dept: VASCULAR SURGERY | Facility: CLINIC | Age: 62
End: 2022-06-22

## 2022-06-22 PROBLEM — I83.11 VARICOSE VEINS OF BOTH LOWER EXTREMITIES WITH INFLAMMATION: Status: ACTIVE | Noted: 2022-06-22

## 2022-06-22 PROBLEM — I83.12 VARICOSE VEINS OF BOTH LOWER EXTREMITIES WITH INFLAMMATION: Status: ACTIVE | Noted: 2022-06-22

## 2022-06-22 NOTE — TELEPHONE ENCOUNTER
Called pt and I explained when her testing is scheduled before surgery with labs and Covid test.  Pt voiced understanding.

## 2022-06-22 NOTE — TELEPHONE ENCOUNTER
Caller: Lita Sparks    Relationship: Self    Best call back number: [phone]    What is the best time to reach you: AFTERNOONS    Who are you requesting to speak with (clinical staff, provider,  specific staff member): ANYBODY    What was the call regarding: PATIENT HAS QUESTIONS REGARDING THEIR PAT ON 7.5.22    Do you require a callback: YES

## 2022-07-01 ENCOUNTER — TELEPHONE (OUTPATIENT)
Dept: VASCULAR SURGERY | Facility: CLINIC | Age: 62
End: 2022-07-01

## 2022-07-05 ENCOUNTER — LAB (OUTPATIENT)
Dept: LAB | Facility: HOSPITAL | Age: 62
End: 2022-07-05

## 2022-07-05 ENCOUNTER — TELEPHONE (OUTPATIENT)
Dept: PODIATRY | Facility: CLINIC | Age: 62
End: 2022-07-05

## 2022-07-05 ENCOUNTER — PRE-ADMISSION TESTING (OUTPATIENT)
Dept: PREADMISSION TESTING | Facility: HOSPITAL | Age: 62
End: 2022-07-05

## 2022-07-05 VITALS
DIASTOLIC BLOOD PRESSURE: 97 MMHG | HEART RATE: 101 BPM | HEIGHT: 61 IN | OXYGEN SATURATION: 94 % | BODY MASS INDEX: 29.47 KG/M2 | WEIGHT: 156.09 LBS | SYSTOLIC BLOOD PRESSURE: 143 MMHG | RESPIRATION RATE: 18 BRPM

## 2022-07-05 DIAGNOSIS — I83.11 VARICOSE VEINS OF BOTH LOWER EXTREMITIES WITH INFLAMMATION: ICD-10-CM

## 2022-07-05 DIAGNOSIS — I87.2 VENOUS INSUFFICIENCY OF BOTH LOWER EXTREMITIES: ICD-10-CM

## 2022-07-05 DIAGNOSIS — I83.12 VARICOSE VEINS OF BOTH LOWER EXTREMITIES WITH INFLAMMATION: ICD-10-CM

## 2022-07-05 LAB
ACANTHOCYTES BLD QL SMEAR: ABNORMAL
ANION GAP SERPL CALCULATED.3IONS-SCNC: 9 MMOL/L (ref 5–15)
BUN SERPL-MCNC: 11 MG/DL (ref 8–23)
BUN/CREAT SERPL: 12.8 (ref 7–25)
BURR CELLS BLD QL SMEAR: ABNORMAL
CALCIUM SPEC-SCNC: 9.5 MG/DL (ref 8.6–10.5)
CHLORIDE SERPL-SCNC: 103 MMOL/L (ref 98–107)
CO2 SERPL-SCNC: 27 MMOL/L (ref 22–29)
CREAT SERPL-MCNC: 0.86 MG/DL (ref 0.57–1)
DEPRECATED RDW RBC AUTO: 47.9 FL (ref 37–54)
EGFRCR SERPLBLD CKD-EPI 2021: 76.5 ML/MIN/1.73
EOSINOPHIL # BLD MANUAL: 0.28 10*3/MM3 (ref 0–0.4)
EOSINOPHIL NFR BLD MANUAL: 3.1 % (ref 0.3–6.2)
ERYTHROCYTE [DISTWIDTH] IN BLOOD BY AUTOMATED COUNT: 14.6 % (ref 12.3–15.4)
GIANT PLATELETS: ABNORMAL
GLUCOSE SERPL-MCNC: 98 MG/DL (ref 65–99)
HCT VFR BLD AUTO: 42.2 % (ref 34–46.6)
HGB BLD-MCNC: 13.6 G/DL (ref 12–15.9)
LYMPHOCYTES # BLD MANUAL: 4.06 10*3/MM3 (ref 0.7–3.1)
LYMPHOCYTES NFR BLD MANUAL: 10.3 % (ref 5–12)
MCH RBC QN AUTO: 29.1 PG (ref 26.6–33)
MCHC RBC AUTO-ENTMCNC: 32.2 G/DL (ref 31.5–35.7)
MCV RBC AUTO: 90.2 FL (ref 79–97)
MONOCYTES # BLD: 0.94 10*3/MM3 (ref 0.1–0.9)
NEUTROPHILS # BLD AUTO: 3.77 10*3/MM3 (ref 1.7–7)
NEUTROPHILS NFR BLD MANUAL: 40.2 % (ref 42.7–76)
NEUTS BAND NFR BLD MANUAL: 1 % (ref 0–5)
PLASMA CELL PREC NFR BLD MANUAL: 1 % (ref 0–0)
PLATELET # BLD AUTO: 312 10*3/MM3 (ref 140–450)
PMV BLD AUTO: 12.2 FL (ref 6–12)
POIKILOCYTOSIS BLD QL SMEAR: ABNORMAL
POTASSIUM SERPL-SCNC: 4.1 MMOL/L (ref 3.5–5.2)
RBC # BLD AUTO: 4.68 10*6/MM3 (ref 3.77–5.28)
SARS-COV-2 ORF1AB RESP QL NAA+PROBE: NOT DETECTED
SMUDGE CELLS BLD QL SMEAR: ABNORMAL
SODIUM SERPL-SCNC: 139 MMOL/L (ref 136–145)
VARIANT LYMPHS NFR BLD MANUAL: 39.2 % (ref 19.6–45.3)
VARIANT LYMPHS NFR BLD MANUAL: 5.2 % (ref 0–5)
WBC NRBC COR # BLD: 9.14 10*3/MM3 (ref 3.4–10.8)

## 2022-07-05 PROCEDURE — 85007 BL SMEAR W/DIFF WBC COUNT: CPT

## 2022-07-05 PROCEDURE — 93005 ELECTROCARDIOGRAM TRACING: CPT

## 2022-07-05 PROCEDURE — C9803 HOPD COVID-19 SPEC COLLECT: HCPCS

## 2022-07-05 PROCEDURE — 36415 COLL VENOUS BLD VENIPUNCTURE: CPT

## 2022-07-05 PROCEDURE — 85025 COMPLETE CBC W/AUTO DIFF WBC: CPT

## 2022-07-05 PROCEDURE — 80048 BASIC METABOLIC PNL TOTAL CA: CPT

## 2022-07-05 PROCEDURE — 93010 ELECTROCARDIOGRAM REPORT: CPT | Performed by: INTERNAL MEDICINE

## 2022-07-05 PROCEDURE — U0004 COV-19 TEST NON-CDC HGH THRU: HCPCS

## 2022-07-05 NOTE — TELEPHONE ENCOUNTER
tiffany [phone] called pre op prework up called and needs to know if and when if so to stop plavix

## 2022-07-05 NOTE — DISCHARGE INSTRUCTIONS
Before you come to the hospital        Arrival time: AS DIRECTED BY OFFICE     YOU MAY TAKE THE FOLLOWING MEDICATION(S) THE MORNING OF SURGERY WITH A SIP OF WATER: amLODIPine (NORVASC), metoprolol tartrate (LOPRESSOR)       ***PLEASE HOLD YOUR losartan (COZAAR) 24 HOURS PRIOR TO SURGERY***               ALL OTHER HOME MEDICATION CHECK WITH YOUR PHYSICIAN (especially if you are taking diabetes medicines or blood thinners)    Do not take any Erectile Dysfunction medications (EX: CIALIS, VIAGRA) 24 hours prior to surgery      If you were given and instructed to use a germ- killing soap, use as directed the night before surgery and the morning of surgery before coming to the hospital.             Eating and drinking restrictions prior to scheduled arrival time    2 Hours before arrival time STOP   Drinking Clear liquids (water, apple juice-no pulp)     6 Hours before arrival time STOP   Milk or drinks that contain milk, full liquids    6 Hours before arrival time STOP   Light meals or foods, such as toast or cereal    8 Hours before arrival time STOP   Heavy foods, such as meat, fried foods, or fatty foods    (It is extremely important that you follow these guidelines to prevent delay or cancelation of your procedure)     Clear Liquids  Water and flavored water                                                                      Clear Fruit juices, such as cranberry juice and apple juice.  Black coffee (NO cream of any kind, including powdered).  Plain tea  Clear bouillon or broth.  Flavored gelatin.  Soda.  Gatorade or Powerade.  Full liquid examples  Juices that have pulp.  Frozen ice pops that contain fruit pieces.  Coffee with creamer  Milk.  Yogurt.              MANAGING PAIN AFTER SURGERY    We know you are probably wondering what your pain will be like after surgery.  Following surgery it is unrealistic to expect you will not have pain.   Pain is how our bodies let us know that something is wrong or cautions us  to be careful.  That said, our goal is to make your pain tolerable.    Methods we may use to treat your pain include (oral or IV medications, PCAs, epidurals, nerve blocks, etc.)   While some procedures require IV pain medications for a short time after surgery, transitioning to pain medications by mouth allows for better management of pain.   Your nurse will encourage you to take oral pain medications whenever possible.  IV medications work almost immediately, but only last a short while.  Taking medications by mouth allows for a more constant level of medication in your blood stream for a longer period of time.      Once your pain is out of control it is harder to get back under control.  It is important you are aware when your next dose of pain medication is due.  If you are admitted, your nurse may write the time of your next dose on the white board in your room to help you remember.      We are interested in your pain and encourage you to inform us about aggravating factors during your visit.   Many times a simple repositioning every few hours can make a big difference.    If your physician says it is okay, do not let your pain prevent you from getting out of bed. Be sure to call your nurse for assistance prior to getting up so you do not fall.      Before surgery, please decide your tolerable pain goal.  These faces help describe the pain ratings we use on a 0-10 scale.   Be prepared to tell us your goal and whether or not you take pain or anxiety medications at home.

## 2022-07-06 ENCOUNTER — HOSPITAL ENCOUNTER (OUTPATIENT)
Dept: CARDIOLOGY | Facility: HOSPITAL | Age: 62
Discharge: HOME | End: 2022-07-06
Admitting: NURSE PRACTITIONER

## 2022-07-06 ENCOUNTER — TELEPHONE (OUTPATIENT)
Dept: VASCULAR SURGERY | Facility: CLINIC | Age: 62
End: 2022-07-06

## 2022-07-06 VITALS
DIASTOLIC BLOOD PRESSURE: 97 MMHG | SYSTOLIC BLOOD PRESSURE: 143 MMHG | BODY MASS INDEX: 29.45 KG/M2 | HEIGHT: 61 IN | WEIGHT: 156 LBS

## 2022-07-06 DIAGNOSIS — I36.1 NONRHEUMATIC TRICUSPID VALVE REGURGITATION: ICD-10-CM

## 2022-07-06 LAB
QT INTERVAL: 400 MS
QTC INTERVAL: 452 MS

## 2022-07-06 PROCEDURE — 93308 TTE F-UP OR LMTD: CPT | Performed by: INTERNAL MEDICINE

## 2022-07-06 PROCEDURE — 93308 TTE F-UP OR LMTD: CPT

## 2022-07-06 PROCEDURE — 93321 DOPPLER ECHO F-UP/LMTD STD: CPT

## 2022-07-06 PROCEDURE — 93325 DOPPLER ECHO COLOR FLOW MAPG: CPT

## 2022-07-06 PROCEDURE — 93321 DOPPLER ECHO F-UP/LMTD STD: CPT | Performed by: INTERNAL MEDICINE

## 2022-07-06 PROCEDURE — 93325 DOPPLER ECHO COLOR FLOW MAPG: CPT | Performed by: INTERNAL MEDICINE

## 2022-07-06 NOTE — TELEPHONE ENCOUNTER
Tried to contact the patient to remind her of her arrival time for surgery tomorrow and to see if she had any questions, but I was unable to reach her or leave a message.

## 2022-07-07 ENCOUNTER — ANESTHESIA EVENT (OUTPATIENT)
Dept: PERIOP | Facility: HOSPITAL | Age: 62
End: 2022-07-07

## 2022-07-07 ENCOUNTER — ANESTHESIA (OUTPATIENT)
Dept: PERIOP | Facility: HOSPITAL | Age: 62
End: 2022-07-07

## 2022-07-07 ENCOUNTER — HOSPITAL ENCOUNTER (OUTPATIENT)
Facility: HOSPITAL | Age: 62
Setting detail: HOSPITAL OUTPATIENT SURGERY
Discharge: HOME | End: 2022-07-07
Attending: SURGERY | Admitting: SURGERY

## 2022-07-07 ENCOUNTER — APPOINTMENT (OUTPATIENT)
Dept: ULTRASOUND IMAGING | Facility: HOSPITAL | Age: 62
End: 2022-07-07

## 2022-07-07 VITALS
DIASTOLIC BLOOD PRESSURE: 91 MMHG | OXYGEN SATURATION: 95 % | HEART RATE: 70 BPM | SYSTOLIC BLOOD PRESSURE: 143 MMHG | RESPIRATION RATE: 16 BRPM | TEMPERATURE: 96.9 F

## 2022-07-07 DIAGNOSIS — I83.12 VARICOSE VEINS OF BOTH LOWER EXTREMITIES WITH INFLAMMATION: ICD-10-CM

## 2022-07-07 DIAGNOSIS — I87.2 VENOUS INSUFFICIENCY OF BOTH LOWER EXTREMITIES: ICD-10-CM

## 2022-07-07 DIAGNOSIS — I83.11 VARICOSE VEINS OF BOTH LOWER EXTREMITIES WITH INFLAMMATION: ICD-10-CM

## 2022-07-07 PROCEDURE — 76937 US GUIDE VASCULAR ACCESS: CPT

## 2022-07-07 PROCEDURE — C1888 ENDOVAS NON-CARDIAC ABL CATH: HCPCS | Performed by: SURGERY

## 2022-07-07 PROCEDURE — 25010000002 FENTANYL CITRATE (PF) 100 MCG/2ML SOLUTION: Performed by: NURSE ANESTHETIST, CERTIFIED REGISTERED

## 2022-07-07 PROCEDURE — 36475 ENDOVENOUS RF 1ST VEIN: CPT | Performed by: SURGERY

## 2022-07-07 PROCEDURE — 25010000002 CEFAZOLIN PER 500 MG: Performed by: SURGERY

## 2022-07-07 PROCEDURE — C1894 INTRO/SHEATH, NON-LASER: HCPCS | Performed by: SURGERY

## 2022-07-07 PROCEDURE — 0 LIDOCAINE 1 % SOLUTION: Performed by: SURGERY

## 2022-07-07 PROCEDURE — 25010000002 PROPOFOL 1000 MG/100ML EMULSION: Performed by: NURSE ANESTHETIST, CERTIFIED REGISTERED

## 2022-07-07 RX ORDER — SODIUM CHLORIDE 0.9 % (FLUSH) 0.9 %
3 SYRINGE (ML) INJECTION EVERY 12 HOURS SCHEDULED
Status: DISCONTINUED | OUTPATIENT
Start: 2022-07-07 | End: 2022-07-07 | Stop reason: HOSPADM

## 2022-07-07 RX ORDER — SODIUM CHLORIDE, SODIUM LACTATE, POTASSIUM CHLORIDE, CALCIUM CHLORIDE 600; 310; 30; 20 MG/100ML; MG/100ML; MG/100ML; MG/100ML
1000 INJECTION, SOLUTION INTRAVENOUS CONTINUOUS
Status: DISCONTINUED | OUTPATIENT
Start: 2022-07-07 | End: 2022-07-07 | Stop reason: HOSPADM

## 2022-07-07 RX ORDER — SODIUM CHLORIDE 0.9 % (FLUSH) 0.9 %
3 SYRINGE (ML) INJECTION AS NEEDED
Status: DISCONTINUED | OUTPATIENT
Start: 2022-07-07 | End: 2022-07-07 | Stop reason: HOSPADM

## 2022-07-07 RX ORDER — IBUPROFEN 600 MG/1
600 TABLET ORAL ONCE AS NEEDED
Status: DISCONTINUED | OUTPATIENT
Start: 2022-07-07 | End: 2022-07-07 | Stop reason: HOSPADM

## 2022-07-07 RX ORDER — LIDOCAINE HYDROCHLORIDE 10 MG/ML
INJECTION, SOLUTION INFILTRATION; PERINEURAL AS NEEDED
Status: DISCONTINUED | OUTPATIENT
Start: 2022-07-07 | End: 2022-07-07 | Stop reason: HOSPADM

## 2022-07-07 RX ORDER — OXYCODONE AND ACETAMINOPHEN 7.5; 325 MG/1; MG/1
2 TABLET ORAL EVERY 4 HOURS PRN
Status: DISCONTINUED | OUTPATIENT
Start: 2022-07-07 | End: 2022-07-07 | Stop reason: HOSPADM

## 2022-07-07 RX ORDER — DROPERIDOL 2.5 MG/ML
0.62 INJECTION, SOLUTION INTRAMUSCULAR; INTRAVENOUS ONCE AS NEEDED
Status: DISCONTINUED | OUTPATIENT
Start: 2022-07-07 | End: 2022-07-07 | Stop reason: HOSPADM

## 2022-07-07 RX ORDER — ACETAMINOPHEN 500 MG
1000 TABLET ORAL ONCE
Status: COMPLETED | OUTPATIENT
Start: 2022-07-07 | End: 2022-07-07

## 2022-07-07 RX ORDER — SODIUM CHLORIDE, SODIUM LACTATE, POTASSIUM CHLORIDE, CALCIUM CHLORIDE 600; 310; 30; 20 MG/100ML; MG/100ML; MG/100ML; MG/100ML
100 INJECTION, SOLUTION INTRAVENOUS CONTINUOUS
Status: DISCONTINUED | OUTPATIENT
Start: 2022-07-07 | End: 2022-07-07 | Stop reason: HOSPADM

## 2022-07-07 RX ORDER — ONDANSETRON 2 MG/ML
4 INJECTION INTRAMUSCULAR; INTRAVENOUS ONCE AS NEEDED
Status: DISCONTINUED | OUTPATIENT
Start: 2022-07-07 | End: 2022-07-07 | Stop reason: HOSPADM

## 2022-07-07 RX ORDER — PROPOFOL 10 MG/ML
INJECTION, EMULSION INTRAVENOUS CONTINUOUS PRN
Status: DISCONTINUED | OUTPATIENT
Start: 2022-07-07 | End: 2022-07-07 | Stop reason: SURG

## 2022-07-07 RX ORDER — OXYCODONE AND ACETAMINOPHEN 10; 325 MG/1; MG/1
1 TABLET ORAL ONCE AS NEEDED
Status: DISCONTINUED | OUTPATIENT
Start: 2022-07-07 | End: 2022-07-07 | Stop reason: HOSPADM

## 2022-07-07 RX ORDER — NALOXONE HCL 0.4 MG/ML
0.4 VIAL (ML) INJECTION AS NEEDED
Status: DISCONTINUED | OUTPATIENT
Start: 2022-07-07 | End: 2022-07-07 | Stop reason: HOSPADM

## 2022-07-07 RX ORDER — FENTANYL CITRATE 50 UG/ML
25 INJECTION, SOLUTION INTRAMUSCULAR; INTRAVENOUS
Status: DISCONTINUED | OUTPATIENT
Start: 2022-07-07 | End: 2022-07-07 | Stop reason: HOSPADM

## 2022-07-07 RX ORDER — LABETALOL HYDROCHLORIDE 5 MG/ML
5 INJECTION, SOLUTION INTRAVENOUS
Status: DISCONTINUED | OUTPATIENT
Start: 2022-07-07 | End: 2022-07-07 | Stop reason: HOSPADM

## 2022-07-07 RX ORDER — LIDOCAINE HYDROCHLORIDE 10 MG/ML
0.5 INJECTION, SOLUTION EPIDURAL; INFILTRATION; INTRACAUDAL; PERINEURAL ONCE AS NEEDED
Status: DISCONTINUED | OUTPATIENT
Start: 2022-07-07 | End: 2022-07-07 | Stop reason: HOSPADM

## 2022-07-07 RX ORDER — FLUMAZENIL 0.1 MG/ML
0.2 INJECTION INTRAVENOUS AS NEEDED
Status: DISCONTINUED | OUTPATIENT
Start: 2022-07-07 | End: 2022-07-07 | Stop reason: HOSPADM

## 2022-07-07 RX ORDER — BUPIVACAINE HCL/0.9 % NACL/PF 0.1 %
2 PLASTIC BAG, INJECTION (ML) EPIDURAL ONCE
Status: COMPLETED | OUTPATIENT
Start: 2022-07-07 | End: 2022-07-07

## 2022-07-07 RX ORDER — LIDOCAINE HYDROCHLORIDE 20 MG/ML
INJECTION, SOLUTION EPIDURAL; INFILTRATION; INTRACAUDAL; PERINEURAL AS NEEDED
Status: DISCONTINUED | OUTPATIENT
Start: 2022-07-07 | End: 2022-07-07 | Stop reason: SURG

## 2022-07-07 RX ORDER — SODIUM CHLORIDE 9 MG/ML
INJECTION, SOLUTION INTRAVENOUS AS NEEDED
Status: DISCONTINUED | OUTPATIENT
Start: 2022-07-07 | End: 2022-07-07 | Stop reason: HOSPADM

## 2022-07-07 RX ORDER — SODIUM CHLORIDE 0.9 % (FLUSH) 0.9 %
3-10 SYRINGE (ML) INJECTION AS NEEDED
Status: DISCONTINUED | OUTPATIENT
Start: 2022-07-07 | End: 2022-07-07 | Stop reason: HOSPADM

## 2022-07-07 RX ORDER — FENTANYL CITRATE 50 UG/ML
INJECTION, SOLUTION INTRAMUSCULAR; INTRAVENOUS AS NEEDED
Status: DISCONTINUED | OUTPATIENT
Start: 2022-07-07 | End: 2022-07-07 | Stop reason: SURG

## 2022-07-07 RX ADMIN — ACETAMINOPHEN 1000 MG: 500 TABLET, FILM COATED ORAL at 06:40

## 2022-07-07 RX ADMIN — FENTANYL CITRATE 50 MCG: 50 INJECTION, SOLUTION INTRAMUSCULAR; INTRAVENOUS at 07:43

## 2022-07-07 RX ADMIN — SODIUM CHLORIDE, POTASSIUM CHLORIDE, SODIUM LACTATE AND CALCIUM CHLORIDE 1000 ML: 600; 310; 30; 20 INJECTION, SOLUTION INTRAVENOUS at 06:10

## 2022-07-07 RX ADMIN — Medication 2 G: at 07:46

## 2022-07-07 RX ADMIN — PROPOFOL 75 MCG/KG/MIN: 10 INJECTION, EMULSION INTRAVENOUS at 07:43

## 2022-07-07 RX ADMIN — LIDOCAINE HYDROCHLORIDE 100 MG: 20 INJECTION, SOLUTION EPIDURAL; INFILTRATION; INTRACAUDAL; PERINEURAL at 07:43

## 2022-07-07 RX ADMIN — FENTANYL CITRATE 50 MCG: 50 INJECTION, SOLUTION INTRAMUSCULAR; INTRAVENOUS at 07:57

## 2022-07-07 NOTE — OP NOTE
Lita Sparks  7/7/2022     PREOPERATIVE DIAGNOSIS: Venous insufficiency of both lower extremities [I87.2]  Varicose veins of both lower extremities with inflammation [I83.11, I83.12]     POSTOPERATIVE DIAGNOSIS: Post-Op Diagnosis Codes:     * Venous insufficiency of both lower extremities [I87.2]     * Varicose veins of both lower extremities with inflammation [I83.11, I83.12]     PROCEDURE PERFORMED:  1.  Ultrasound-guided access of the right greater saphenous vein  2.  Radiofrequency ablation of the right greater saphenous vein     SURGEON: Cade Ko MD     ANESTHESIA: General.    PREPARATION: Routine.    STAFF: Circulator: Randi Ro RN  Scrub Person: Rashida Mehta  Assistant: Mary Lou Carrera  Vascular Ultrasound Technician: Caryn Llanos    Estimated Blood Loss: minimal    SPECIMENS: None    COMPLICATIONS: None apparent    INDICATIONS: Lita Sparks is a 61 y.o. female who we are currently following for lower extremity edema and clinical venous insufficiency.  She returns today after having undergone venous duplex with insufficiency study for further evaluation.  I did personally review this in the office.  It does show evidence of significant reflux in the bilateral greater saphenous veins.  There was also incidentally noted to be a Baker's cyst in the right popliteal fossa.  Since her last visit she has been wearing compression and using leg elevation on a daily basis over the last 3 months as well as using leg elevation and exercise.  However despite this she continues to have significant reflux symptoms with edema, heaviness, and discomfort.  She also continues to have venous congestion in the bilateral feet with her legs in a dependent position.. The indications, risks, and possible complications of the procedure were explained to the patient, who voiced understanding and wished to proceed with surgery.     PROCEDURE IN DETAIL: The patient was taken to the operating room and placed  on the operating table in a supine position. After MAC anesthesia was obtained, the right lower extremity was prepped and draped in a sterile manner.  Under ultrasound guidance and using a micropuncture technique the right lower extremity greater saphenous vein was cannulated and a microwire was placed.  This was confirmed under ultrasound.  A small stab incision was made with 11 blade and a 7 Upper sorbian sheath was placed.  The patient was placed in Trendelenburg position and the saphenofemoral junction was identified under ultrasound.  The catheter was placed up to the junction and pulled back 3 cm and marked.  Next, tumescent fluid was instilled along the entire length of the vein under ultrasound guidance.  Once sufficient tumescent fluid was placed radio frequency ablation had commenced.  There was a total of 8 RF cycles for a total treatment length of 46 cm for a total treatment time of 2 minutes and 40 seconds.  There was an average of 15 W at an average temperature 120°C.  Upon completion of the ablation the catheter and sheath were removed.  Direct pressure was held for an additional 5-10 minutes of ensure hemostasis.  An Ace wrap was placed from the toes to the groin.  Sterile dressings were applied. The patient tolerated the procedure well. Sponge and needle counts were correct. The patient was then awakened and transferred to the outpatient center in stable condition.

## 2022-07-07 NOTE — DISCHARGE INSTRUCTIONS
VEIN RADIOFREQUENCY ABLATION DISCHARGE INSTRUCTIONS    Compression therapy  Following the procedure, the initial dressing and the compression wrap should remain in place for 24 hours.  The bandage is generally removed at home the next day. For the first 2 weeks, knee-high compression may be worn during the daytime.  Compression helps minimize swelling and bruising by pressing on the veins that remain in the leg. Without compression, treated veins may open up or bleed.     For first 24 hours following sedation   Do not drive or operate motorized vehicles or equipment.   Do not return to work for 1 week.   Do not assume responsibility for small children or anyone dependent on your care.   Do not drink alcohol.   Do not participate in rough play and sports.   Have a responsible adult stay with until the morning after surgery.    Activity   You may begin walking immediately after your procedure. This is good for you, but don’t overdo it. Walk at a relaxed pace.   You should refrain from driving a vehicle if you have continued pain in the operative leg(s), are taking narcotic pain medications such as Percocet, or have any suggestion or evidence of delayed reaction time.    For one week after treatment:   Avoid strenuous exercise.   Avoid sitting in a hot tub, swimming pool, or saunas.   Elevate your leg above the level of your heart periodically (for 15 to 30 minutes) throughout the day.    Bathing   For the first 24 hours following your vein procedure, do not shower    After 24 hours, you may shower without your compression stockings on. Pat your skin dry and put on compression stockings or a wrap back on.    Care of the incision(s)   When you remove the stocking or wrap, wash any incision sites gently with soap and water once a day. Pat the area(s) dry. If your incisions are taped closed, remove the tapes after one week. If an incision site is oozing, place gauze on it under the compression stocking or  wrap.    Discomfort   You may take over-the-counter pain medications such as acetaminophen (Tylenol) or ibuprofen (Advil, Motrin) per package directions. You may also apply an ice (covered with a washcloth) to the tender areas.   Following catheter ablation of the vein, you should expect mild to moderate pain in the thigh with redness in the area of vein closure.    When to contact your physician  If you experience any of the following:   Discomfort not relieved by pain medication.   Fever higher than 100.4 degrees Fahrenheit (38 degrees Celsius)   Significant swelling or pain in the leg   Darkening or ulceration (sores) on areas of skin    POSTOPERATIVE APPOINTMENT:  If you have had radiofrequency ablation of saphenous vein(s), you will have an ultrasound of the treated vein(s) following surgery. You may also have a return appointment on that same day. If you do not have the appointment scheduled at the time of your discharge please call [phone].

## 2022-07-07 NOTE — ANESTHESIA PREPROCEDURE EVALUATION
Anesthesia Evaluation     Patient summary reviewed   no history of anesthetic complications:  NPO Solid Status: > 8 hours             Airway   Mallampati: II  TM distance: >3 FB  Dental    (+) edentulous    Pulmonary    (+) a smoker Current Smoked day of surgery, COPD, home oxygen,   Cardiovascular   Exercise tolerance: poor (<4 METS)    ECG reviewed    (+) hypertension 2 medications or greater, valvular problems/murmurs TI, CHF , KWAN, hyperlipidemia,   (-) pacemaker, past MI, cardiac stents, CABG      Neuro/Psych  (+) CVA (2021),    GI/Hepatic/Renal/Endo    (+)  GERD,  thyroid problem   (-) no renal disease, diabetes    Musculoskeletal     Abdominal    Substance History      OB/GYN          Other   arthritis,                      Anesthesia Plan    ASA 3     MAC     (Did not bring home O2. Took plavix 2 days ago )  intravenous induction     Anesthetic plan, risks, benefits, and alternatives have been provided, discussed and informed consent has been obtained with: patient.        CODE STATUS:

## 2022-07-07 NOTE — ANESTHESIA POSTPROCEDURE EVALUATION
Patient: Lita Sparks    Procedure Summary     Date: 07/07/22 Room / Location: [facility] OR  / [facility] HYBRID OR 12    Anesthesia Start: 0741 Anesthesia Stop: 0832    Procedure: RIGHT LOWER EXTREMITY SAPHENOUS VEIN RADIO FREQUENCY ABLATION (Right Leg Lower) Diagnosis:       Venous insufficiency of both lower extremities      Varicose veins of both lower extremities with inflammation      (Venous insufficiency of both lower extremities [I87.2])      (Varicose veins of both lower extremities with inflammation [I83.11, I83.12])    Surgeons: Cade Ko MD Provider: Millie Hou CRNA    Anesthesia Type: MAC ASA Status: 3          Anesthesia Type: MAC    Vitals  Vitals Value Taken Time   /71 07/07/22 0831   Temp     Pulse 80 07/07/22 0832   Resp     SpO2 90 % 07/07/22 0832   Vitals shown include unvalidated device data.        Post Anesthesia Care and Evaluation    Patient location during evaluation: PHASE II  Patient participation: complete - patient participated  Level of consciousness: awake and alert  Pain management: adequate    Airway patency: patent  Anesthetic complications: No anesthetic complications    Cardiovascular status: acceptable  Respiratory status: acceptable  Hydration status: acceptable    Comments: Blood pressure (!) 154/102, pulse 90, temperature 96.8 °F (36 °C), temperature source Temporal, resp. rate 16, SpO2 96 %, not currently breastfeeding.    Pt discharged from PACU based on yahaira score >8

## 2022-07-07 NOTE — INTERVAL H&P NOTE
H&P reviewed. The patient was examined and there are no changes to the H&P.  For RLE GSV RFA. Risks of radiofrequency ablation include, but are not limited to, bleeding, infection, vessel damage, nerve damage, DVT, phlebitis, and pulmonary embolus.  The patient understands these risks and wishes to proceed with procedure.

## 2022-07-09 LAB
BH CV ECHO MEAS - AO ROOT DIAM: 2.9 CM
BH CV ECHO MEAS - EDV(MOD-SP4): 44.2 ML
BH CV ECHO MEAS - EF(MOD-SP4): 66.7 %
BH CV ECHO MEAS - ESV(MOD-SP4): 14.7 ML
BH CV ECHO MEAS - LV DIASTOLIC VOL/BSA (35-75): 26 CM2
BH CV ECHO MEAS - LV SYSTOLIC VOL/BSA (12-30): 8.6 CM2
BH CV ECHO MEAS - LVOT AREA: 3.5 CM2
BH CV ECHO MEAS - LVOT DIAM: 2.1 CM
BH CV ECHO MEAS - MV A MAX VEL: 101 CM/SEC
BH CV ECHO MEAS - MV DEC TIME: 0.26 MSEC
BH CV ECHO MEAS - MV E MAX VEL: 61.8 CM/SEC
BH CV ECHO MEAS - MV E/A: 0.61
BH CV ECHO MEAS - RAP SYSTOLE: 5 MMHG
BH CV ECHO MEAS - RVSP: 63.7 MMHG
BH CV ECHO MEAS - SI(MOD-SP4): 17.4 ML/M2
BH CV ECHO MEAS - SV(MOD-SP4): 29.5 ML
BH CV ECHO MEAS - TR MAX PG: 58.7 MMHG
BH CV ECHO MEAS - TR MAX VEL: 383 CM/SEC
MAXIMAL PREDICTED HEART RATE: 158 BPM
STRESS TARGET HR: 134 BPM

## 2022-07-14 ENCOUNTER — OFFICE VISIT (OUTPATIENT)
Dept: CARDIOLOGY | Facility: CLINIC | Age: 62
End: 2022-07-14

## 2022-07-14 ENCOUNTER — TELEPHONE (OUTPATIENT)
Dept: VASCULAR SURGERY | Facility: CLINIC | Age: 62
End: 2022-07-14

## 2022-07-14 VITALS
OXYGEN SATURATION: 96 % | SYSTOLIC BLOOD PRESSURE: 130 MMHG | WEIGHT: 155 LBS | DIASTOLIC BLOOD PRESSURE: 82 MMHG | BODY MASS INDEX: 29.27 KG/M2 | HEART RATE: 67 BPM | HEIGHT: 61 IN

## 2022-07-14 DIAGNOSIS — E78.2 MIXED HYPERLIPIDEMIA: ICD-10-CM

## 2022-07-14 DIAGNOSIS — E05.90 HYPERTHYROIDISM: ICD-10-CM

## 2022-07-14 DIAGNOSIS — I27.20 PULMONARY HYPERTENSION: ICD-10-CM

## 2022-07-14 DIAGNOSIS — I10 PRIMARY HYPERTENSION: ICD-10-CM

## 2022-07-14 DIAGNOSIS — I36.1 NONRHEUMATIC TRICUSPID VALVE REGURGITATION: ICD-10-CM

## 2022-07-14 DIAGNOSIS — J41.0 SIMPLE CHRONIC BRONCHITIS: ICD-10-CM

## 2022-07-14 DIAGNOSIS — I50.812 CHRONIC RIGHT-SIDED HEART FAILURE: Primary | ICD-10-CM

## 2022-07-14 DIAGNOSIS — F17.200 SMOKER UNMOTIVATED TO QUIT: ICD-10-CM

## 2022-07-14 PROCEDURE — 99214 OFFICE O/P EST MOD 30 MIN: CPT | Performed by: NURSE PRACTITIONER

## 2022-07-14 NOTE — PROGRESS NOTES
Chief Complaint  Hypertension (8wk F/U. Started Losartan LOV. Has been monitoring at home. )    Subjective          Lita Sparks presents to [facility] for routine follow-up of outpatient testing.  Limited 2D echo on 7/6/2022 revealed normal left ventricular systolic function with ejection fraction 61 to 65%, mild dilation of the right ventricular cavity with mildly reduced right ventricular systolic function, moderate dilation of the right atrial cavity, moderate to severe pulmonary hypertension and trivial pericardial effusion with no evidence of cardiac tamponade. She has chronic right-sided heart failure, moderate to severe pulmonary hypertension, hypertension, hyperlipidemia, COPD, GERD, hyperthyroidism, previous cerebrovascular accident, venous insufficiency s/p right saphenous vein ablation 7/7/22 and tobacco use. She complains of progressively worsening dyspnea with mild exertion. She reports chronic orthopnea requiring two pillows to sleep. She reports chronic bilateral lower extremity edema as well. She reports intermittent palpitations and dizziness. Patient denies chest pain, syncope, PND or decreased stamina.  Patient denies any signs of bleeding.    Hypertension  This is a chronic problem. The current episode started more than 1 year ago. The problem is uncontrolled. Associated symptoms include orthopnea, palpitations, peripheral edema and shortness of breath. Pertinent negatives include no anxiety, blurred vision, chest pain, headaches, malaise/fatigue, neck pain, PND or sweats. Risk factors for coronary artery disease include post-menopausal state, dyslipidemia and smoking/tobacco exposure. Current antihypertension treatment includes diuretics and beta blockers. The current treatment provides significant improvement. Hypertensive end-organ damage includes CVA. Identifiable causes of hypertension include a thyroid problem.   Hyperlipidemia  This is a chronic problem. The  "current episode started more than 1 year ago. Associated symptoms include shortness of breath. Pertinent negatives include no chest pain. Current antihyperlipidemic treatment includes statins. Risk factors for coronary artery disease include post-menopausal, hypertension and dyslipidemia.     I have reviewed and confirmed the accuracy of the ROS  JAMIL Alvarez      Objective   Vital Signs:   /82   Pulse 67   Ht 154.9 cm (61\")   Wt 70.3 kg (155 lb)   SpO2 96%   BMI 29.29 kg/m²     Vitals and nursing note reviewed.   Constitutional:       General: Not in acute distress.     Appearance: Normal and healthy appearance. Well-developed, overweight and not in distress. Not diaphoretic.   Eyes:      General: Lids are normal.         Right eye: No discharge.         Left eye: No discharge.      Conjunctiva/sclera: Conjunctivae normal.      Pupils: Pupils are equal, round, and reactive to light.   HENT:      Head: Normocephalic and atraumatic.      Jaw: There is normal jaw occlusion.      Right Ear: External ear normal.      Left Ear: External ear normal.      Nose: Nose normal.   Neck:      Thyroid: No thyromegaly.      Vascular: No carotid bruit, JVD or JVR. JVD normal.      Trachea: Trachea normal. No tracheal deviation.   Pulmonary:      Effort: Pulmonary effort is normal. No respiratory distress.      Breath sounds: Examination of the left-lower field reveals rales. No decreased breath sounds. No wheezing. No rhonchi. Rales present.   Chest:      Chest wall: Not tender to palpatation.   Cardiovascular:      PMI at left midclavicular line. Normal rate. Regular rhythm. Normal S1. Normal S2.      Murmurs: There is a systolic murmur. There is a diastolic murmur.      No gallop. No click. No rub.   Pulses:     Intact distal pulses. No decreased pulses.   Edema:     Peripheral edema present.     Pretibial: bilateral trace pitting edema of the pretibial area.     Ankle: bilateral trace pitting edema of the " ankle.     Feet: bilateral trace pitting edema of the feet.  Abdominal:      General: Bowel sounds are normal. There is no distension.      Palpations: Abdomen is soft.      Tenderness: There is no abdominal tenderness.   Musculoskeletal: Normal range of motion.         General: No tenderness or deformity.      Cervical back: Normal range of motion and neck supple. Skin:     General: Skin is warm and dry.      Coloration: Skin is not pale.      Findings: No erythema or rash.   Neurological:      General: No focal deficit present.      Mental Status: Alert, oriented to person, place, and time and oriented to person, place and time.   Psychiatric:         Attention and Perception: Attention and perception normal.         Mood and Affect: Mood and affect normal.         Speech: Speech normal.         Behavior: Behavior normal.         Thought Content: Thought content normal.         Cognition and Memory: Cognition and memory normal.         Judgment: Judgment normal.        Result Review :   The following data was reviewed by: JAMIL Alvarez on 7/14/2022:  Common labs    Common Labsle 7/5/22 7/5/22    1240 1240   Glucose  98   BUN  11   Creatinine  0.86   Sodium  139   Potassium  4.1   Chloride  103   Calcium  9.5   WBC 9.14    Hemoglobin 13.6    Hematocrit 42.2    Platelets 312            Data reviewed: Cardiology studies 2d echo 2/23/22 and 7/6/22         Assessment and Plan    Diagnoses and all orders for this visit:    1. Chronic right-sided heart failure (HCC)-NYHA class III.  Compensated.  Continue Lasix.  Increase metoprolol to tartrate to 25 mg twice daily. Reviewed signs and symptoms of CHF and what to report with the patient. Patient instructed to restrict sodium and weigh daily. Report weight gain of greater than 2 lbs overnight or 5 lbs in 1 week. Pt verbalized understanding of instructions and plan of care.     2. Nonrheumatic tricuspid valve regurgitation (Primary)-mild on 2D echo 7/6/2022.    3.  Primary hypertension-blood pressure is borderline in office today. Continue losartan and metoprolol tartrate.  Monitor and record daily blood pressure. Report readings consistently higher than 130/90 or consistently lower than 100/60.     4. Mixed hyperlipidemia- management per PCP.  Continue atorvastatin.    5. Hyperthyroidism-patient follows with endocrinology.  Stable.    6. Simple chronic bronchitis (HCC)-stable on room air.    7. Smoker unmotivated to quit- Lita Sparks  reports that she has been smoking cigarettes. She has a 23.50 pack-year smoking history. She has never used smokeless tobacco.. I have educated her on the risk of diseases from using tobacco products such as cancer, COPD and heart disease. I advised her to quit and she is not willing to quit. I spent 3  minutes counseling the patient.    8. Pulmonary hypertension (HCC)-moderate to severe on 2D echo 7/6/2022.  Check PFTs.  Consider referral to pulmonology, if abnormal.     Follow Up   Return in about 3 months (around 10/14/2022) for Next scheduled follow up.  Patient was given instructions and counseling regarding her condition or for health maintenance advice. Please see specific information pulled into the AVS if appropriate.

## 2022-07-15 ENCOUNTER — HOSPITAL ENCOUNTER (OUTPATIENT)
Dept: ULTRASOUND IMAGING | Facility: HOSPITAL | Age: 62
Discharge: HOME | End: 2022-07-15
Admitting: SURGERY

## 2022-07-15 ENCOUNTER — OFFICE VISIT (OUTPATIENT)
Dept: VASCULAR SURGERY | Facility: CLINIC | Age: 62
End: 2022-07-15

## 2022-07-15 VITALS
OXYGEN SATURATION: 93 % | WEIGHT: 155 LBS | HEART RATE: 68 BPM | HEIGHT: 61 IN | RESPIRATION RATE: 18 BRPM | BODY MASS INDEX: 29.27 KG/M2 | DIASTOLIC BLOOD PRESSURE: 84 MMHG | SYSTOLIC BLOOD PRESSURE: 150 MMHG

## 2022-07-15 DIAGNOSIS — I83.12 VARICOSE VEINS OF BOTH LOWER EXTREMITIES WITH INFLAMMATION: ICD-10-CM

## 2022-07-15 DIAGNOSIS — I10 ESSENTIAL HYPERTENSION: ICD-10-CM

## 2022-07-15 DIAGNOSIS — I87.2 VENOUS INSUFFICIENCY OF BOTH LOWER EXTREMITIES: ICD-10-CM

## 2022-07-15 DIAGNOSIS — I87.313 STASIS EDEMA WITH ULCER OF BOTH LOWER EXTREMITIES: ICD-10-CM

## 2022-07-15 DIAGNOSIS — I83.11 VARICOSE VEINS OF BOTH LOWER EXTREMITIES WITH INFLAMMATION: ICD-10-CM

## 2022-07-15 DIAGNOSIS — I87.2 STASIS DERMATITIS OF BOTH LEGS: ICD-10-CM

## 2022-07-15 DIAGNOSIS — L97.919 STASIS EDEMA WITH ULCER OF BOTH LOWER EXTREMITIES: ICD-10-CM

## 2022-07-15 DIAGNOSIS — E78.2 MIXED HYPERLIPIDEMIA: ICD-10-CM

## 2022-07-15 DIAGNOSIS — I87.2 VENOUS INSUFFICIENCY OF BOTH LOWER EXTREMITIES: Primary | ICD-10-CM

## 2022-07-15 DIAGNOSIS — L97.929 STASIS EDEMA WITH ULCER OF BOTH LOWER EXTREMITIES: ICD-10-CM

## 2022-07-15 PROCEDURE — 93971 EXTREMITY STUDY: CPT | Performed by: SURGERY

## 2022-07-15 PROCEDURE — 93971 EXTREMITY STUDY: CPT

## 2022-07-15 PROCEDURE — 99214 OFFICE O/P EST MOD 30 MIN: CPT | Performed by: SURGERY

## 2022-07-15 RX ORDER — HYDROCHLOROTHIAZIDE 12.5 MG/1
12.5 TABLET ORAL DAILY
COMMUNITY
Start: 2022-06-13

## 2022-07-15 NOTE — H&P
HPI     I had the pleasure of seeing your patient in the office today for follow up.  As you recall, the patient is a 61 y.o. female who we are currently following for lower extremity venous insufficiency and venous congestion of the bilateral lower legs.  She returns today after having undergone recent right lower extremity greater saphenous vein radiofrequency ablation.  She tolerated the procedure well.  Venous duplex done today which I reviewed of the right lower extremity shows successful closure of the right greater saphenous vein from just beyond the saphenofemoral junction through the mid calf.  There was no DVT.  She notes less heaviness and edema of the right lower extremity unless venous congestion with less purplish discoloration of the toes.  She does continue to have symptoms in the left lower extremity and her previous venous duplex does show significant reflux in that left greater saphenous vein.  She otherwise denies any arterial symptoms with no claudication or rest pain.  She has no other acute complaints today.      Review of Systems   Constitutional: Negative.  Negative for activity change, appetite change, chills, diaphoresis, fatigue and fever.   HENT: Negative.  Negative for congestion, sneezing, sore throat and trouble swallowing.    Eyes: Negative.  Negative for visual disturbance.   Respiratory: Negative.  Negative for chest tightness and shortness of breath.    Cardiovascular: Positive for leg swelling (Improved on the right after recent RFA). Negative for chest pain and palpitations.   Gastrointestinal: Negative.  Negative for abdominal distention, abdominal pain, nausea and vomiting.   Endocrine: Negative.    Genitourinary: Negative.    Musculoskeletal: Negative.    Skin: Negative.         She notes some purplish discoloration to the toes and distal forefeet bilaterally when her feet are in a dependent position.  This improves with leg elevation and lying supine.   Allergic/Immunologic:  "Negative.    Neurological: Negative.    Hematological: Negative.    Psychiatric/Behavioral: Negative.        /84 (BP Location: Left arm, Patient Position: Sitting, Cuff Size: Adult)   Pulse 68   Resp 18   Ht 154.9 cm (61\")   Wt 70.3 kg (155 lb)   SpO2 93%   BMI 29.29 kg/m²   Physical Exam  Vitals reviewed.   Constitutional:       Appearance: Normal appearance.   HENT:      Head: Normocephalic and atraumatic.      Nose: Nose normal.      Mouth/Throat:      Mouth: Mucous membranes are moist.   Eyes:      Extraocular Movements: Extraocular movements intact.      Pupils: Pupils are equal, round, and reactive to light.   Cardiovascular:      Rate and Rhythm: Normal rate and regular rhythm.      Pulses:           Carotid pulses are 2+ on the right side and 2+ on the left side.       Radial pulses are 2+ on the right side and 2+ on the left side.        Femoral pulses are 2+ on the right side and 2+ on the left side.       Popliteal pulses are 2+ on the right side and 2+ on the left side.        Dorsalis pedis pulses are 2+ on the right side and 2+ on the left side.        Posterior tibial pulses are 2+ on the right side and 2+ on the left side.      Comments: She has some edema of the bilateral lower extremities from ankles to knees.  She does have some venous congestion in the bilateral forefeet and toes.  This is improved in the right lower extremity today after recent right greater saphenous vein radiofrequency ablation.  However she continues to have symptoms in the left lower extremity.  She was not wearing her compression today.  Pulmonary:      Effort: Pulmonary effort is normal. No respiratory distress.   Abdominal:      General: There is no distension.      Palpations: Abdomen is soft. There is no mass.      Tenderness: There is no abdominal tenderness.   Musculoskeletal:         General: Normal range of motion.      Cervical back: Normal range of motion and neck supple.      Right lower leg: Edema " present.      Left lower leg: Edema present.   Skin:     General: Skin is warm and dry.      Capillary Refill: Capillary refill takes less than 2 seconds.   Neurological:      General: No focal deficit present.      Mental Status: She is alert and oriented to person, place, and time.   Psychiatric:         Mood and Affect: Mood normal.         Behavior: Behavior normal.         Thought Content: Thought content normal.         Judgment: Judgment normal.         DIAGNOSTIC DATA:        US Guided Vascular Access    Result Date: 7/7/2022  Narrative: History: Swelling, venous insufficiency.  Comment: Limited right lower extremity venous duplex was performed using gray scale imaging and color flow Doppler.  FINDINGS: The right greater saphenous vein was successfully cannulated under ultrasound guidance and the radiofrequency ablation catheter was successfully positioned with tip 3 cm distal to the saphenofemoral junction.      Impression: Successful cannulation and radiofrequency ablation catheter placement at the time of right lower extremity greater saphenous vein radiofrequency ablation. This report was finalized on 07/07/2022 13:37 by Dr. Cade Ko MD.    Adult Transthoracic Echo Limited W/ Cont if Necessary Per Protocol    Result Date: 7/9/2022  Narrative: · Left ventricular ejection fraction appears to be 61 - 65%. Left ventricular systolic function is normal. · The right ventricular cavity is mildly dilated. · Mildly reduced right ventricular systolic function noted. · The right atrial cavity is moderately dilated. · There is a trivial pericardial effusion. The effusion is fluid filled. There is no evidence of cardiac tamponade. · Moderate to severe pulmonary hypertension is present.      US Venous Doppler Lower Extremity Right (duplex)    Result Date: 7/15/2022  Narrative: History: Swelling      Impression: Impression: 1. There is no evidence of deep venous thrombosis of the right lower extremity. 2. The  greater saphenous vein is closed from zones 2 through 7.  Comments: Right lower extremity venous duplex exam was performed using color Doppler flow, Doppler wave form analysis, and grayscale imaging, with and without compression. There is no evidence of deep venous thrombosis of the common femoral, superficial femoral, popliteal, posterior tibial, and peroneal veins. There is no thrombus identified in the saphenofemoral junction.  This report was finalized on 07/15/2022 14:32 by Dr. Harvinder Morgan MD.    US Venous Doppler Lower Extremity Bilateral (duplex)    Result Date: 6/20/2022  Narrative: History: Swelling   Comments: Venous valvular insufficiency testing was performed in the bilateral lower extremities using duplex ultrasound with compression techniques.  The common femoral vein, superficial femoral, popliteal vein, posterior tibial vein, peroneal vein, greater saphenous vein, and lesser saphenous veins were interrogated.  On the right, the greater saphenous vein at the junction measured 7mm. In the mid thigh measured 3.1mm. Above the knee measured 4.1mm. In the mid calf measured 2.9mm. At the ankle measured 3.1mm. There is greater than 0.5 seconds of reflux from the junction to the mid thigh. The lesser saphenous vein is within normal limits and no evidence of reflux. There is no evidence of DVT. There is a Baker's cyst in the popliteal space measuring 4.9 x 0.8 x 1.67 cm.  On the left, the greater saphenous vein at the junction measured 7.2mm. In the mid thigh measured 4.1mm. Above the knee measured 3.3mm. In the mid calf measured 2.6mm. At the ankle measured 2.7mm. There is greater than 0.5 seconds of reflux at the junction.. The lesser saphenous vein is within normal limits and no evidence of reflux. There is no evidence of DVT.      Impression: Impression: There is evidence of venous insufficiency in both lower extremity greater saphenous veins. There is also a Baker's cyst in the right popliteal space  with measurements above.   This report was finalized on 06/20/2022 13:14 by Dr. Harvinder Morgan MD.      Patient Active Problem List   Diagnosis   • Cough   • Esophageal reflux   • Hyperthyroidism   • Arthritis   • Pharyngoesophageal dysphagia   • Smoker unmotivated to quit   • KWAN (dyspnea on exertion)   • Vaginitis and vulvovaginitis   • Chest heaviness   • Oxygen desaturation during sleep   • Simple chronic bronchitis (HCC)   • Tobacco abuse counseling   • Hypokalemia on potassium and due for repeat labs   • Venous insufficiency   • Primary hypertension   • Mixed hyperlipidemia   • Nonrheumatic tricuspid valve regurgitation   • Chronic right-sided heart failure (HCC)   • Varicose veins of both lower extremities with inflammation   • Pulmonary hypertension (HCC)         ICD-10-CM ICD-9-CM   1. Venous insufficiency of both lower extremities  I87.2 459.81   2. Essential hypertension  I10 401.9   3. Mixed hyperlipidemia  E78.2 272.2       Lab Frequency Next Occurrence   Follow Anesthesia Guidelines / Protocol Once 06/20/2022   Obtain Informed Consent Once 06/25/2022   Provide NPO Instructions to Patient Once 06/25/2022   Chlorhexidine Skin Prep Once 06/25/2022   Full Pulmonary Function Test With Bronchodilator Once 07/19/2022   COVID-19,APTIMA PANTHER,PAD IN-HOUSE,NP/OP/NASAL SWAB IN UTM/VTM/SALINE/LIQUID AMIES TRANSPORT MEDIA/NP WASH OR ASPIRATE, 24 HR TAT - Swab, Nasal Cavity Once 07/14/2022       PLAN: After thoroughly evaluating Lita Sparks, I believe the best course of action is to proceed now with left lower extremity greater saphenous vein radiofrequency ablation.  She tolerated recent right lower extremity greater saphenous vein radiofrequency ablation very well.  Her reflux symptoms in that limb have improved with less edema, and less venous congestion in the foot.  Fortunately despite ongoing compression, leg elevation, and exercise she continues to have significant reflux symptoms in the left lower  extremity with edema, heaviness, and ongoing congestion.  Her previous venous duplex had shown significant reflux in that left greater saphenous vein.  Given these findings I think she would not benefit from left lower extremity greater saphenous vein radiofrequency ablation. Risks of radiofrequency ablation include, but are not limited to, bleeding, infection, vessel damage, nerve damage, DVT, phlebitis, and pulmonary embolus.  The patient understands these risks and wishes to proceed with procedure.  .  The patient is to continue taking their medications as previously discussed.   I did discuss vascular risk factors as they pertain to the progression of vascular disease including controlling pretension, and hyperlipidemia.  Blood pressure is somewhat elevated today at 150/80.  Her dose of metoprolol was just recently increased by her primary care physician and we will continue having her follow their to ensure ongoing control of blood pressure. BMI is >= 25 and <30. (Overweight) The following options were offered after discussion;: exercise counseling/recommendations and nutrition counseling/recommendations. I did  extensively on smoking cessation, and the patient was advised of the continued risks of smoking.  I provided over 10 minutes counseling on this matter. This was all discussed in full with complete understanding.

## 2022-07-15 NOTE — PROGRESS NOTES
07/15/2022      Rmua Jenkins, JAMIL  [address] [facility] [address]        Lita Sparks  1960    Chief Complaint   Patient presents with   • Post-op     1 week follow-up from RIGHT LOWER EXTREMITY SAPHENOUS VEIN RADIO FREQUENCY ABLATION. US pad venous lower ext right.  Pt is a Current Smoker. Pt denies any stroke-like symptoms.        Dear Ruma Jenkins APRN:    HPI     I had the pleasure of seeing your patient in the office today for follow up.  As you recall, the patient is a 61 y.o. female who we are currently following for lower extremity venous insufficiency and venous congestion of the bilateral lower legs.  She returns today after having undergone recent right lower extremity greater saphenous vein radiofrequency ablation.  She tolerated the procedure well.  Venous duplex done today which I reviewed of the right lower extremity shows successful closure of the right greater saphenous vein from just beyond the saphenofemoral junction through the mid calf.  There was no DVT.  She notes less heaviness and edema of the right lower extremity unless venous congestion with less purplish discoloration of the toes.  She does continue to have symptoms in the left lower extremity and her previous venous duplex does show significant reflux in that left greater saphenous vein.  She otherwise denies any arterial symptoms with no claudication or rest pain.  She has no other acute complaints today.      Review of Systems   Constitutional: Negative.  Negative for activity change, appetite change, chills, diaphoresis, fatigue and fever.   HENT: Negative.  Negative for congestion, sneezing, sore throat and trouble swallowing.    Eyes: Negative.  Negative for visual disturbance.   Respiratory: Negative.  Negative for chest tightness and shortness of breath.    Cardiovascular: Positive for leg swelling (Improved on the right after recent RFA). Negative for chest pain and palpitations.   Gastrointestinal:  "Negative.  Negative for abdominal distention, abdominal pain, nausea and vomiting.   Endocrine: Negative.    Genitourinary: Negative.    Musculoskeletal: Negative.    Skin: Negative.         She notes some purplish discoloration to the toes and distal forefeet bilaterally when her feet are in a dependent position.  This improves with leg elevation and lying supine.   Allergic/Immunologic: Negative.    Neurological: Negative.    Hematological: Negative.    Psychiatric/Behavioral: Negative.        /84 (BP Location: Left arm, Patient Position: Sitting, Cuff Size: Adult)   Pulse 68   Resp 18   Ht 154.9 cm (61\")   Wt 70.3 kg (155 lb)   SpO2 93%   BMI 29.29 kg/m²   Physical Exam  Vitals reviewed.   Constitutional:       Appearance: Normal appearance.   HENT:      Head: Normocephalic and atraumatic.      Nose: Nose normal.      Mouth/Throat:      Mouth: Mucous membranes are moist.   Eyes:      Extraocular Movements: Extraocular movements intact.      Pupils: Pupils are equal, round, and reactive to light.   Cardiovascular:      Rate and Rhythm: Normal rate and regular rhythm.      Pulses:           Carotid pulses are 2+ on the right side and 2+ on the left side.       Radial pulses are 2+ on the right side and 2+ on the left side.        Femoral pulses are 2+ on the right side and 2+ on the left side.       Popliteal pulses are 2+ on the right side and 2+ on the left side.        Dorsalis pedis pulses are 2+ on the right side and 2+ on the left side.        Posterior tibial pulses are 2+ on the right side and 2+ on the left side.      Comments: She has some edema of the bilateral lower extremities from ankles to knees.  She does have some venous congestion in the bilateral forefeet and toes.  This is improved in the right lower extremity today after recent right greater saphenous vein radiofrequency ablation.  However she continues to have symptoms in the left lower extremity.  She was not wearing her " compression today.  Pulmonary:      Effort: Pulmonary effort is normal. No respiratory distress.   Abdominal:      General: There is no distension.      Palpations: Abdomen is soft. There is no mass.      Tenderness: There is no abdominal tenderness.   Musculoskeletal:         General: Normal range of motion.      Cervical back: Normal range of motion and neck supple.      Right lower leg: Edema present.      Left lower leg: Edema present.   Skin:     General: Skin is warm and dry.      Capillary Refill: Capillary refill takes less than 2 seconds.   Neurological:      General: No focal deficit present.      Mental Status: She is alert and oriented to person, place, and time.   Psychiatric:         Mood and Affect: Mood normal.         Behavior: Behavior normal.         Thought Content: Thought content normal.         Judgment: Judgment normal.         DIAGNOSTIC DATA:        US Guided Vascular Access    Result Date: 7/7/2022  Narrative: History: Swelling, venous insufficiency.  Comment: Limited right lower extremity venous duplex was performed using gray scale imaging and color flow Doppler.  FINDINGS: The right greater saphenous vein was successfully cannulated under ultrasound guidance and the radiofrequency ablation catheter was successfully positioned with tip 3 cm distal to the saphenofemoral junction.      Impression: Successful cannulation and radiofrequency ablation catheter placement at the time of right lower extremity greater saphenous vein radiofrequency ablation. This report was finalized on 07/07/2022 13:37 by Dr. Cade Ko MD.    Adult Transthoracic Echo Limited W/ Cont if Necessary Per Protocol    Result Date: 7/9/2022  Narrative: · Left ventricular ejection fraction appears to be 61 - 65%. Left ventricular systolic function is normal. · The right ventricular cavity is mildly dilated. · Mildly reduced right ventricular systolic function noted. · The right atrial cavity is moderately dilated. ·  There is a trivial pericardial effusion. The effusion is fluid filled. There is no evidence of cardiac tamponade. · Moderate to severe pulmonary hypertension is present.      US Venous Doppler Lower Extremity Right (duplex)    Result Date: 7/15/2022  Narrative: History: Swelling      Impression: Impression: 1. There is no evidence of deep venous thrombosis of the right lower extremity. 2. The greater saphenous vein is closed from zones 2 through 7.  Comments: Right lower extremity venous duplex exam was performed using color Doppler flow, Doppler wave form analysis, and grayscale imaging, with and without compression. There is no evidence of deep venous thrombosis of the common femoral, superficial femoral, popliteal, posterior tibial, and peroneal veins. There is no thrombus identified in the saphenofemoral junction.  This report was finalized on 07/15/2022 14:32 by Dr. Harvinder Morgan MD.    US Venous Doppler Lower Extremity Bilateral (duplex)    Result Date: 6/20/2022  Narrative: History: Swelling   Comments: Venous valvular insufficiency testing was performed in the bilateral lower extremities using duplex ultrasound with compression techniques.  The common femoral vein, superficial femoral, popliteal vein, posterior tibial vein, peroneal vein, greater saphenous vein, and lesser saphenous veins were interrogated.  On the right, the greater saphenous vein at the junction measured 7mm. In the mid thigh measured 3.1mm. Above the knee measured 4.1mm. In the mid calf measured 2.9mm. At the ankle measured 3.1mm. There is greater than 0.5 seconds of reflux from the junction to the mid thigh. The lesser saphenous vein is within normal limits and no evidence of reflux. There is no evidence of DVT. There is a Baker's cyst in the popliteal space measuring 4.9 x 0.8 x 1.67 cm.  On the left, the greater saphenous vein at the junction measured 7.2mm. In the mid thigh measured 4.1mm. Above the knee measured 3.3mm. In the mid  calf measured 2.6mm. At the ankle measured 2.7mm. There is greater than 0.5 seconds of reflux at the junction.. The lesser saphenous vein is within normal limits and no evidence of reflux. There is no evidence of DVT.      Impression: Impression: There is evidence of venous insufficiency in both lower extremity greater saphenous veins. There is also a Baker's cyst in the right popliteal space with measurements above.   This report was finalized on 06/20/2022 13:14 by Dr. Harvinder Morgan MD.      Patient Active Problem List   Diagnosis   • Cough   • Esophageal reflux   • Hyperthyroidism   • Arthritis   • Pharyngoesophageal dysphagia   • Smoker unmotivated to quit   • KWAN (dyspnea on exertion)   • Vaginitis and vulvovaginitis   • Chest heaviness   • Oxygen desaturation during sleep   • Simple chronic bronchitis (HCC)   • Tobacco abuse counseling   • Hypokalemia on potassium and due for repeat labs   • Venous insufficiency   • Primary hypertension   • Mixed hyperlipidemia   • Nonrheumatic tricuspid valve regurgitation   • Chronic right-sided heart failure (HCC)   • Varicose veins of both lower extremities with inflammation   • Pulmonary hypertension (HCC)         ICD-10-CM ICD-9-CM   1. Venous insufficiency of both lower extremities  I87.2 459.81   2. Essential hypertension  I10 401.9   3. Mixed hyperlipidemia  E78.2 272.2       Lab Frequency Next Occurrence   Follow Anesthesia Guidelines / Protocol Once 06/20/2022   Obtain Informed Consent Once 06/25/2022   Provide NPO Instructions to Patient Once 06/25/2022   Chlorhexidine Skin Prep Once 06/25/2022   Full Pulmonary Function Test With Bronchodilator Once 07/19/2022   COVID-19,APTIMA PANTHER,PAD IN-HOUSE,NP/OP/NASAL SWAB IN UTM/VTM/SALINE/LIQUID AMIES TRANSPORT MEDIA/NP WASH OR ASPIRATE, 24 HR TAT - Swab, Nasal Cavity Once 07/14/2022       PLAN: After thoroughly evaluating Lita Sparks, I believe the best course of action is to proceed now with left lower extremity  greater saphenous vein radiofrequency ablation.  She tolerated recent right lower extremity greater saphenous vein radiofrequency ablation very well.  Her reflux symptoms in that limb have improved with less edema, and less venous congestion in the foot.  Fortunately despite ongoing compression, leg elevation, and exercise she continues to have significant reflux symptoms in the left lower extremity with edema, heaviness, and ongoing congestion.  Her previous venous duplex had shown significant reflux in that left greater saphenous vein.  Given these findings I think she would not benefit from left lower extremity greater saphenous vein radiofrequency ablation. Risks of radiofrequency ablation include, but are not limited to, bleeding, infection, vessel damage, nerve damage, DVT, phlebitis, and pulmonary embolus.  The patient understands these risks and wishes to proceed with procedure.  .  The patient is to continue taking their medications as previously discussed.   I did discuss vascular risk factors as they pertain to the progression of vascular disease including controlling pretension, and hyperlipidemia.  Blood pressure is somewhat elevated today at 150/80.  Her dose of metoprolol was just recently increased by her primary care physician and we will continue having her follow their to ensure ongoing control of blood pressure. BMI is >= 25 and <30. (Overweight) The following options were offered after discussion;: exercise counseling/recommendations and nutrition counseling/recommendations. I did  extensively on smoking cessation, and the patient was advised of the continued risks of smoking.  I provided over 10 minutes counseling on this matter. This was all discussed in full with complete understanding.  Thank you for allowing me to participate in the care of your patient.  Please do not hesitate to call with any questions or concerns.  We will keep you aware of any further encounters with Lita MERINO  Bridger.      Sincerely Yours,      Cade Ko MD

## 2022-07-25 ENCOUNTER — TELEPHONE (OUTPATIENT)
Dept: VASCULAR SURGERY | Facility: CLINIC | Age: 62
End: 2022-07-25

## 2022-07-25 PROBLEM — I87.313 STASIS EDEMA WITH ULCER OF BOTH LOWER EXTREMITIES: Status: ACTIVE | Noted: 2022-07-25

## 2022-07-25 PROBLEM — L97.919 STASIS EDEMA WITH ULCER OF BOTH LOWER EXTREMITIES: Status: ACTIVE | Noted: 2022-07-25

## 2022-07-25 PROBLEM — L97.929 STASIS EDEMA WITH ULCER OF BOTH LOWER EXTREMITIES: Status: ACTIVE | Noted: 2022-07-25

## 2022-07-25 NOTE — TELEPHONE ENCOUNTER
SPOKE WITH PATIENT REGARDING SURGERY. PT WAS INFORMED OF PRE WORK ON 08/09 AT 1315. PT WAS ALSO INFORMED OF SURGERY ON 08/11 AT 0500. PT WAS INFORMED OF COVID TEST AND VISITATION. PT STATED UNDERSTANDING OF INSTRUCTIONS AND ALL INFORMATION.

## 2022-08-09 ENCOUNTER — LAB (OUTPATIENT)
Dept: LAB | Facility: HOSPITAL | Age: 62
End: 2022-08-09

## 2022-08-09 ENCOUNTER — PRE-ADMISSION TESTING (OUTPATIENT)
Dept: PREADMISSION TESTING | Facility: HOSPITAL | Age: 62
End: 2022-08-09

## 2022-08-09 VITALS
RESPIRATION RATE: 20 BRPM | HEIGHT: 61 IN | WEIGHT: 152.34 LBS | BODY MASS INDEX: 28.76 KG/M2 | HEART RATE: 102 BPM | DIASTOLIC BLOOD PRESSURE: 99 MMHG | OXYGEN SATURATION: 96 % | SYSTOLIC BLOOD PRESSURE: 150 MMHG

## 2022-08-09 DIAGNOSIS — I87.2 STASIS DERMATITIS OF BOTH LEGS: ICD-10-CM

## 2022-08-09 DIAGNOSIS — I87.2 VENOUS INSUFFICIENCY OF BOTH LOWER EXTREMITIES: ICD-10-CM

## 2022-08-09 LAB
ANION GAP SERPL CALCULATED.3IONS-SCNC: 13 MMOL/L (ref 5–15)
BUN SERPL-MCNC: 13 MG/DL (ref 8–23)
BUN/CREAT SERPL: 11.5 (ref 7–25)
CALCIUM SPEC-SCNC: 9.8 MG/DL (ref 8.6–10.5)
CHLORIDE SERPL-SCNC: 103 MMOL/L (ref 98–107)
CO2 SERPL-SCNC: 25 MMOL/L (ref 22–29)
CREAT SERPL-MCNC: 1.13 MG/DL (ref 0.57–1)
DEPRECATED RDW RBC AUTO: 47.4 FL (ref 37–54)
EGFRCR SERPLBLD CKD-EPI 2021: 55.1 ML/MIN/1.73
ERYTHROCYTE [DISTWIDTH] IN BLOOD BY AUTOMATED COUNT: 15.1 % (ref 12.3–15.4)
GLUCOSE SERPL-MCNC: 69 MG/DL (ref 65–99)
HCT VFR BLD AUTO: 50.1 % (ref 34–46.6)
HGB BLD-MCNC: 16.5 G/DL (ref 12–15.9)
MCH RBC QN AUTO: 28.7 PG (ref 26.6–33)
MCHC RBC AUTO-ENTMCNC: 32.9 G/DL (ref 31.5–35.7)
MCV RBC AUTO: 87.3 FL (ref 79–97)
PLATELET # BLD AUTO: 312 10*3/MM3 (ref 140–450)
PMV BLD AUTO: 12.8 FL (ref 6–12)
POTASSIUM SERPL-SCNC: 3.4 MMOL/L (ref 3.5–5.2)
RBC # BLD AUTO: 5.74 10*6/MM3 (ref 3.77–5.28)
SARS-COV-2 ORF1AB RESP QL NAA+PROBE: NOT DETECTED
SODIUM SERPL-SCNC: 141 MMOL/L (ref 136–145)
WBC NRBC COR # BLD: 9.75 10*3/MM3 (ref 3.4–10.8)

## 2022-08-09 PROCEDURE — 36415 COLL VENOUS BLD VENIPUNCTURE: CPT

## 2022-08-09 PROCEDURE — C9803 HOPD COVID-19 SPEC COLLECT: HCPCS

## 2022-08-09 PROCEDURE — 85027 COMPLETE CBC AUTOMATED: CPT

## 2022-08-09 PROCEDURE — U0004 COV-19 TEST NON-CDC HGH THRU: HCPCS

## 2022-08-09 PROCEDURE — 80048 BASIC METABOLIC PNL TOTAL CA: CPT

## 2022-08-09 NOTE — DISCHARGE INSTRUCTIONS
Before you come to the hospital        Arrival time: AS DIRECTED BY OFFICE     YOU MAY TAKE THE FOLLOWING MEDICATION(S) THE MORNING OF SURGERY WITH A SIP OF WATER: METOPROLOL           DO NOT TAKE YOUR LOSARTAN 24 HOURS PRIOR TO SURGERY          ALL OTHER HOME MEDICATION CHECK WITH YOUR PHYSICIAN (especially if you are taking diabetes medicines or blood thinners)    Do not take any Erectile Dysfunction medications (EX: CIALIS, VIAGRA) 24 hours prior to surgery.      If you were given and instructed to use a germ- killing soap, use as directed the night before surgery and the morning of surgery or as directed by your surgeon before coming to the hospital.             Eating and drinking restrictions prior to scheduled arrival time    2 Hours before arrival time STOP   Drinking Clear liquids (water, apple juice-no pulp)     6 Hours before arrival time STOP   Milk or drinks that contain milk, full liquids    6 Hours before arrival time STOP   Light meals or foods, such as toast or cereal    8 Hours before arrival time STOP   Heavy foods, such as meat, fried foods, or fatty foods    (It is extremely important that you follow these guidelines to prevent delay or cancelation of your procedure)     Clear Liquids  Water and flavored water                                                                      Clear Fruit juices, such as cranberry juice and apple juice.  Black coffee (NO cream of any kind, including powdered).  Plain tea  Clear bouillon or broth.  Flavored gelatin.  Soda.  Gatorade or Powerade.  Full liquid examples  Juices that have pulp.  Frozen ice pops that contain fruit pieces.  Coffee with creamer  Milk.  Yogurt.                MANAGING PAIN AFTER SURGERY    We know you are probably wondering what your pain will be like after surgery.  Following surgery it is unrealistic to expect you will not have pain.   Pain is how our bodies let us know that something is wrong or cautions us to be careful.  That said,  our goal is to make your pain tolerable.    Methods we may use to treat your pain include (oral or IV medications, PCAs, epidurals, nerve blocks, etc.)   While some procedures require IV pain medications for a short time after surgery, transitioning to pain medications by mouth allows for better management of pain.   Your nurse will encourage you to take oral pain medications whenever possible.  IV medications work almost immediately, but only last a short while.  Taking medications by mouth allows for a more constant level of medication in your blood stream for a longer period of time.      Once your pain is out of control it is harder to get back under control.  It is important you are aware when your next dose of pain medication is due.  If you are admitted, your nurse may write the time of your next dose on the white board in your room to help you remember.      We are interested in your pain and encourage you to inform us about aggravating factors during your visit.   Many times a simple repositioning every few hours can make a big difference.    If your physician says it is okay, do not let your pain prevent you from getting out of bed. Be sure to call your nurse for assistance prior to getting up so you do not fall.      Before surgery, please decide your tolerable pain goal.  These faces help describe the pain ratings we use on a 0-10 scale.   Be prepared to tell us your goal and whether or not you take pain or anxiety medications at home.          Preparing for Surgery  Preparing for surgery is an important part of your care. It can make things go more smoothly and help you avoid complications. The steps leading up to surgery may vary among hospitals. Follow all instructions given to you by your health care providers. Ask questions if you do not understand something. Talk about any concerns that you have.  Here are some questions to consider asking before your surgery:  If my surgery is not an emergency (is  elective), when would be the best time to have the surgery?  What arrangements do I need to make for work, home, or school?  What will my recovery be like? How long will it be before I can return to normal activities?  Will I need to prepare my home? Will I need to arrange care for me or my children?  Should I expect to have pain after surgery? What are my pain management options? Are there nonmedical options that I can try for pain?  Tell a health care provider about:  Any allergies you have.  All medicines you are taking, including vitamins, herbs, eye drops, creams, and over-the-counter medicines.  Any problems you or family members have had with anesthetic medicines.  Any blood disorders you have.  Any surgeries you have had.  Any medical conditions you have.  Whether you are pregnant or may be pregnant.  What are the risks?  The risks and complications of surgery depend on the specific procedure that you have. Discuss all the risks with your health care providers before your surgery. Ask about common surgical complications, which may include:  Infection.  Bleeding or a need for blood replacement (transfusion).  Allergic reactions to medicines.  Damage to surrounding nerves, tissues, or structures.  A blood clot.  Scarring.  Failure of the surgery to correct the problem.  Follow these instructions before the procedure:  Several days or weeks before your procedure  You may have a physical exam by your primary health care provider to make sure it is safe for you to have surgery.  You may have testing. This may include a chest X-ray, blood and urine tests, electrocardiogram (ECG), or other testing.  Ask your health care provider about:  Changing or stopping your regular medicines. This is especially important if you are taking diabetes medicines or blood thinners.  Taking medicines such as aspirin and ibuprofen. These medicines can thin your blood. Do not take these medicines unless your health care provider tells  you to take them.  Taking over-the-counter medicines, vitamins, herbs, and supplements.  Do not use any products that contain nicotine or tobacco, such as cigarettes and e-cigarettes. If you need help quitting, ask your health care provider.  Avoid alcohol.  Ask your health care provider if there are exercises you can do to prepare for surgery.  Eat a healthy diet.   Plan to have someone take you home from the hospital or clinic.  Plan to have a responsible adult care for you for at least 24 hours after you leave the hospital or clinic. This is important.  The day before your procedure  You may be given antibiotic medicine to take by mouth to help prevent infection. Take it as told by your health care provider.  You may be asked to shower with a germ-killing soap.  Follow instructions from your health care provider about eating and drinking restrictions. This includes gum, mints and hard candy.  Pack comfortable clothes according to your procedure.   The day of your procedure  You may need to take another shower with a germ-killing soap before you leave home in the morning.  With a small sip of water, take only the medicines that you are told to take.  Remove all jewelry including rings.   Leave anything you consider valuable at home except hearing aids if needed.  Do not wear any makeup, nail polish, powder, deodorant, lotion, hair accessories, or anything on your skin or body except your clothes.  If you will be staying in the hospital, bring a case to hold your glasses, contacts, or dentures. You may also want to bring your robe and non-skid footwear.  If you wear oxygen at home, bring it with you the day of surgery.  If instructed by your health care provider, bring your sleep apnea device with you on the day of your surgery (if this applies to you).  You may want to leave your suitcase and sleep apnea device in the car until after surgery.   Arrive at the hospital as scheduled.  Bring a friend or family member  with you who can help to answer questions and be present while you meet with your health care provider.  At the hospital  When you arrive at the hospital:  Go to registration located at the main entrance of the hospital. You will be registered and given a beeper and a sheet of name stickers. Take the stickers to the Outpatient nurses desk and place in the black tray. This is to notify staff that you have arrived. Then return to the lobby to wait.   When your beeper lights up and vibrates proceed through the double doors, under the stairs, and a member of the Outpatient Surgery staff will escort you to your preoperative room.  You may have to wear compression sleeves. These help to prevent blood clots and reduce swelling in your legs.  An IV may be inserted into one of your veins.              In the operating room, you may be given one or more of the following:        A medicine to help you relax (sedative).        A medicine to numb the area (local anesthetic).        A medicine to make you fall asleep (general anesthetic).        A medicine that is injected into an area of your body to numb everything below the                      injection site (regional anesthetic).  You may be given an antibiotic through your IV to help prevent infection.  Your surgical site will be marked or identified.    Contact a health care provider if you:  Develop a fever of more than 100.4°F (38°C) or other feelings of illness during the 48 hours before your surgery.  Have symptoms that get worse.  Have questions or concerns about your surgery.  Summary  Preparing for surgery can make the procedure go more smoothly and lower your risk of complications.  Before surgery, make a list of questions and concerns to discuss with your surgeon. Ask about the risks and possible complications.  In the days or weeks before your surgery, follow all instructions from your health care provider. You may need to stop smoking, avoid alcohol, follow  eating restrictions, and change or stop your regular medicines.  Contact your surgeon if you develop a fever or other signs of illness during the few days before your surgery.  This information is not intended to replace advice given to you by your health care provider. Make sure you discuss any questions you have with your health care provider.  Document Revised: 12/21/2018 Document Reviewed: 10/23/2018  Elsevier Patient Education © 2021 [employer]

## 2022-08-10 ENCOUNTER — TELEPHONE (OUTPATIENT)
Dept: VASCULAR SURGERY | Facility: CLINIC | Age: 62
End: 2022-08-10

## 2022-08-11 ENCOUNTER — ANESTHESIA (OUTPATIENT)
Dept: PERIOP | Facility: HOSPITAL | Age: 62
End: 2022-08-11

## 2022-08-11 ENCOUNTER — ANESTHESIA EVENT (OUTPATIENT)
Dept: PERIOP | Facility: HOSPITAL | Age: 62
End: 2022-08-11

## 2022-08-11 ENCOUNTER — APPOINTMENT (OUTPATIENT)
Dept: ULTRASOUND IMAGING | Facility: HOSPITAL | Age: 62
End: 2022-08-11

## 2022-08-11 ENCOUNTER — HOSPITAL ENCOUNTER (OUTPATIENT)
Facility: HOSPITAL | Age: 62
Setting detail: HOSPITAL OUTPATIENT SURGERY
Discharge: HOME | End: 2022-08-11
Attending: SURGERY | Admitting: SURGERY

## 2022-08-11 VITALS
HEART RATE: 88 BPM | OXYGEN SATURATION: 93 % | DIASTOLIC BLOOD PRESSURE: 96 MMHG | TEMPERATURE: 97.5 F | RESPIRATION RATE: 20 BRPM | SYSTOLIC BLOOD PRESSURE: 141 MMHG

## 2022-08-11 DIAGNOSIS — I83.11 VARICOSE VEINS OF BOTH LOWER EXTREMITIES WITH INFLAMMATION: Primary | ICD-10-CM

## 2022-08-11 DIAGNOSIS — I83.12 VARICOSE VEINS OF BOTH LOWER EXTREMITIES WITH INFLAMMATION: Primary | ICD-10-CM

## 2022-08-11 DIAGNOSIS — I87.2 STASIS DERMATITIS OF BOTH LEGS: ICD-10-CM

## 2022-08-11 DIAGNOSIS — I87.2 VENOUS INSUFFICIENCY OF BOTH LOWER EXTREMITIES: ICD-10-CM

## 2022-08-11 PROCEDURE — C1888 ENDOVAS NON-CARDIAC ABL CATH: HCPCS | Performed by: SURGERY

## 2022-08-11 PROCEDURE — 36475 ENDOVENOUS RF 1ST VEIN: CPT | Performed by: SURGERY

## 2022-08-11 PROCEDURE — 76937 US GUIDE VASCULAR ACCESS: CPT

## 2022-08-11 PROCEDURE — 0 LIDOCAINE 1 % SOLUTION: Performed by: SURGERY

## 2022-08-11 PROCEDURE — 25010000002 PROPOFOL 1000 MG/100ML EMULSION: Performed by: NURSE ANESTHETIST, CERTIFIED REGISTERED

## 2022-08-11 PROCEDURE — 25010000002 PROPOFOL 10 MG/ML EMULSION: Performed by: NURSE ANESTHETIST, CERTIFIED REGISTERED

## 2022-08-11 PROCEDURE — 25010000002 CEFAZOLIN PER 500 MG: Performed by: SURGERY

## 2022-08-11 PROCEDURE — C1894 INTRO/SHEATH, NON-LASER: HCPCS | Performed by: SURGERY

## 2022-08-11 RX ORDER — SODIUM CHLORIDE 0.9 % (FLUSH) 0.9 %
3 SYRINGE (ML) INJECTION AS NEEDED
Status: DISCONTINUED | OUTPATIENT
Start: 2022-08-11 | End: 2022-08-11 | Stop reason: HOSPADM

## 2022-08-11 RX ORDER — OXYCODONE AND ACETAMINOPHEN 7.5; 325 MG/1; MG/1
2 TABLET ORAL EVERY 4 HOURS PRN
Status: DISCONTINUED | OUTPATIENT
Start: 2022-08-11 | End: 2022-08-11 | Stop reason: HOSPADM

## 2022-08-11 RX ORDER — SODIUM CHLORIDE, SODIUM LACTATE, POTASSIUM CHLORIDE, CALCIUM CHLORIDE 600; 310; 30; 20 MG/100ML; MG/100ML; MG/100ML; MG/100ML
9 INJECTION, SOLUTION INTRAVENOUS CONTINUOUS
Status: DISCONTINUED | OUTPATIENT
Start: 2022-08-11 | End: 2022-08-11 | Stop reason: HOSPADM

## 2022-08-11 RX ORDER — LIDOCAINE HYDROCHLORIDE 10 MG/ML
INJECTION, SOLUTION INFILTRATION; PERINEURAL AS NEEDED
Status: DISCONTINUED | OUTPATIENT
Start: 2022-08-11 | End: 2022-08-11 | Stop reason: HOSPADM

## 2022-08-11 RX ORDER — BUPIVACAINE HCL/0.9 % NACL/PF 0.1 %
2 PLASTIC BAG, INJECTION (ML) EPIDURAL ONCE
Status: COMPLETED | OUTPATIENT
Start: 2022-08-11 | End: 2022-08-11

## 2022-08-11 RX ORDER — DROPERIDOL 2.5 MG/ML
0.62 INJECTION, SOLUTION INTRAMUSCULAR; INTRAVENOUS ONCE AS NEEDED
Status: DISCONTINUED | OUTPATIENT
Start: 2022-08-11 | End: 2022-08-11 | Stop reason: HOSPADM

## 2022-08-11 RX ORDER — SODIUM CHLORIDE 9 MG/ML
INJECTION, SOLUTION INTRAVENOUS AS NEEDED
Status: DISCONTINUED | OUTPATIENT
Start: 2022-08-11 | End: 2022-08-11 | Stop reason: HOSPADM

## 2022-08-11 RX ORDER — NALOXONE HCL 0.4 MG/ML
0.4 VIAL (ML) INJECTION AS NEEDED
Status: DISCONTINUED | OUTPATIENT
Start: 2022-08-11 | End: 2022-08-11 | Stop reason: HOSPADM

## 2022-08-11 RX ORDER — FLUMAZENIL 0.1 MG/ML
0.2 INJECTION INTRAVENOUS AS NEEDED
Status: DISCONTINUED | OUTPATIENT
Start: 2022-08-11 | End: 2022-08-11 | Stop reason: HOSPADM

## 2022-08-11 RX ORDER — PROPOFOL 10 MG/ML
VIAL (ML) INTRAVENOUS CONTINUOUS PRN
Status: DISCONTINUED | OUTPATIENT
Start: 2022-08-11 | End: 2022-08-11 | Stop reason: SURG

## 2022-08-11 RX ORDER — FENTANYL CITRATE 50 UG/ML
25 INJECTION, SOLUTION INTRAMUSCULAR; INTRAVENOUS
Status: DISCONTINUED | OUTPATIENT
Start: 2022-08-11 | End: 2022-08-11 | Stop reason: HOSPADM

## 2022-08-11 RX ORDER — SODIUM CHLORIDE 0.9 % (FLUSH) 0.9 %
10 SYRINGE (ML) INJECTION EVERY 12 HOURS SCHEDULED
Status: DISCONTINUED | OUTPATIENT
Start: 2022-08-11 | End: 2022-08-11 | Stop reason: HOSPADM

## 2022-08-11 RX ORDER — ONDANSETRON 2 MG/ML
4 INJECTION INTRAMUSCULAR; INTRAVENOUS ONCE AS NEEDED
Status: DISCONTINUED | OUTPATIENT
Start: 2022-08-11 | End: 2022-08-11 | Stop reason: HOSPADM

## 2022-08-11 RX ORDER — SODIUM CHLORIDE 0.9 % (FLUSH) 0.9 %
10 SYRINGE (ML) INJECTION AS NEEDED
Status: DISCONTINUED | OUTPATIENT
Start: 2022-08-11 | End: 2022-08-11 | Stop reason: HOSPADM

## 2022-08-11 RX ORDER — DEXTROSE MONOHYDRATE 25 G/50ML
12.5 INJECTION, SOLUTION INTRAVENOUS AS NEEDED
Status: DISCONTINUED | OUTPATIENT
Start: 2022-08-11 | End: 2022-08-11 | Stop reason: HOSPADM

## 2022-08-11 RX ORDER — LIDOCAINE HYDROCHLORIDE 10 MG/ML
0.5 INJECTION, SOLUTION EPIDURAL; INFILTRATION; INTRACAUDAL; PERINEURAL ONCE AS NEEDED
Status: DISCONTINUED | OUTPATIENT
Start: 2022-08-11 | End: 2022-08-11 | Stop reason: HOSPADM

## 2022-08-11 RX ORDER — IBUPROFEN 600 MG/1
600 TABLET ORAL ONCE AS NEEDED
Status: DISCONTINUED | OUTPATIENT
Start: 2022-08-11 | End: 2022-08-11 | Stop reason: HOSPADM

## 2022-08-11 RX ORDER — OXYCODONE AND ACETAMINOPHEN 10; 325 MG/1; MG/1
1 TABLET ORAL ONCE AS NEEDED
Status: DISCONTINUED | OUTPATIENT
Start: 2022-08-11 | End: 2022-08-11 | Stop reason: HOSPADM

## 2022-08-11 RX ORDER — LIDOCAINE HYDROCHLORIDE 20 MG/ML
INJECTION, SOLUTION EPIDURAL; INFILTRATION; INTRACAUDAL; PERINEURAL AS NEEDED
Status: DISCONTINUED | OUTPATIENT
Start: 2022-08-11 | End: 2022-08-11 | Stop reason: SURG

## 2022-08-11 RX ORDER — PROPOFOL 10 MG/ML
INJECTION, EMULSION INTRAVENOUS AS NEEDED
Status: DISCONTINUED | OUTPATIENT
Start: 2022-08-11 | End: 2022-08-11 | Stop reason: SURG

## 2022-08-11 RX ORDER — LABETALOL HYDROCHLORIDE 5 MG/ML
5 INJECTION, SOLUTION INTRAVENOUS
Status: DISCONTINUED | OUTPATIENT
Start: 2022-08-11 | End: 2022-08-11 | Stop reason: HOSPADM

## 2022-08-11 RX ORDER — MIDAZOLAM HYDROCHLORIDE 1 MG/ML
1 INJECTION INTRAMUSCULAR; INTRAVENOUS
Status: DISCONTINUED | OUTPATIENT
Start: 2022-08-11 | End: 2022-08-11 | Stop reason: HOSPADM

## 2022-08-11 RX ORDER — SODIUM CHLORIDE, SODIUM LACTATE, POTASSIUM CHLORIDE, CALCIUM CHLORIDE 600; 310; 30; 20 MG/100ML; MG/100ML; MG/100ML; MG/100ML
1000 INJECTION, SOLUTION INTRAVENOUS CONTINUOUS
Status: DISCONTINUED | OUTPATIENT
Start: 2022-08-11 | End: 2022-08-11 | Stop reason: HOSPADM

## 2022-08-11 RX ADMIN — LIDOCAINE HYDROCHLORIDE 80 MG: 20 INJECTION, SOLUTION EPIDURAL; INFILTRATION; INTRACAUDAL; PERINEURAL at 08:37

## 2022-08-11 RX ADMIN — PROPOFOL 30 MG: 10 INJECTION, EMULSION INTRAVENOUS at 08:57

## 2022-08-11 RX ADMIN — PROPOFOL 30 MG: 10 INJECTION, EMULSION INTRAVENOUS at 08:46

## 2022-08-11 RX ADMIN — SODIUM CHLORIDE, POTASSIUM CHLORIDE, SODIUM LACTATE AND CALCIUM CHLORIDE 1000 ML: 600; 310; 30; 20 INJECTION, SOLUTION INTRAVENOUS at 08:13

## 2022-08-11 RX ADMIN — PROPOFOL 50 MCG/KG/MIN: 10 INJECTION, EMULSION INTRAVENOUS at 08:37

## 2022-08-11 RX ADMIN — PROPOFOL 60 MG: 10 INJECTION, EMULSION INTRAVENOUS at 08:36

## 2022-08-11 RX ADMIN — Medication 2 G: at 08:38

## 2022-08-11 NOTE — DISCHARGE INSTRUCTIONS
VEIN RADIOFREQUENCY ABLATION DISCHARGE INSTRUCTIONS    Compression therapy  Following the procedure, the initial dressing and the compression wrap should remain in place for 24 hours.  The bandage is generally removed at home the next day. For the first 2 weeks, knee-high compression may be worn during the daytime.  Compression helps minimize swelling and bruising by pressing on the veins that remain in the leg. Without compression, treated veins may open up or bleed.     For first 24 hours following sedation   Do not drive or operate motorized vehicles or equipment.   Do not return to work for 1 week.   Do not assume responsibility for small children or anyone dependent on your care.   Do not drink alcohol.   Do not participate in rough play and sports.   Have a responsible adult stay with until the morning after surgery.    Activity   You may begin walking immediately after your procedure. This is good for you, but don’t overdo it. Walk at a relaxed pace.   You should refrain from driving a vehicle if you have continued pain in the operative leg(s), are taking narcotic pain medications such as Percocet, or have any suggestion or evidence of delayed reaction time.    For one week after treatment:   Avoid strenuous exercise.   Avoid sitting in a hot tub, swimming pool, or saunas.   Elevate your leg above the level of your heart periodically (for 15 to 30 minutes) throughout the day.    Bathing   For the first 24 hours following your vein procedure, do not shower    After 24 hours, you may shower without your compression stockings on. Pat your skin dry and put on compression stockings or a wrap back on.    Care of the incision(s)   When you remove the stocking or wrap, wash any incision sites gently with soap and water once a day. Pat the area(s) dry. If your incisions are taped closed, remove the tapes after one week. If an incision site is oozing, place gauze on it under the compression stocking or  wrap.    Discomfort   You may take over-the-counter pain medications such as acetaminophen (Tylenol) or ibuprofen (Advil, Motrin) per package directions. You may also apply an ice (covered with a washcloth) to the tender areas.   Following catheter ablation of the vein, you should expect mild to moderate pain in the thigh with redness in the area of vein closure.    When to contact your physician  If you experience any of the following:   Discomfort not relieved by pain medication.   Fever higher than 100.4 degrees Fahrenheit (38 degrees Celsius)   Significant swelling or pain in the leg   Darkening or ulceration (sores) on areas of skin    POSTOPERATIVE APPOINTMENT:  If you have had radiofrequency ablation of saphenous vein(s), you will have an ultrasound of the treated vein(s) following surgery. You may also have a return appointment on that same day. If you do not have the appointment scheduled at the time of your discharge please call [phone].

## 2022-08-11 NOTE — ANESTHESIA PREPROCEDURE EVALUATION
Anesthesia Evaluation     Patient summary reviewed   no history of anesthetic complications:  NPO Solid Status: > 8 hours             Airway   Mallampati: II  TM distance: >3 FB  Dental    (+) edentulous    Pulmonary    (+) a smoker Current, COPD, home oxygen (qhs),   Cardiovascular   Exercise tolerance: poor (<4 METS)    ECG reviewed    (+) hypertension 2 medications or greater, valvular problems/murmurs TI, KWAN, PVD, hyperlipidemia,   (-) pacemaker, past MI, cardiac stents, CABG      Neuro/Psych  (+) CVA (2021),    GI/Hepatic/Renal/Endo    (+)  GERD,  thyroid problem   (-) no renal disease, diabetes    Musculoskeletal     Abdominal    Substance History      OB/GYN          Other   arthritis,                        Anesthesia Plan    ASA 3     MAC     ( )  intravenous induction     Anesthetic plan, risks, benefits, and alternatives have been provided, discussed and informed consent has been obtained with: patient.        CODE STATUS:

## 2022-08-11 NOTE — INTERVAL H&P NOTE
H&P reviewed. The patient was examined and there are no changes to the H&P.  For LLE GSV RFA. Risks of radiofrequency ablation include, but are not limited to, bleeding, infection, vessel damage, nerve damage, DVT, phlebitis, and pulmonary embolus.  The patient understands these risks and wishes to proceed with procedure.

## 2022-08-11 NOTE — DISCHARGE INSTR - APPOINTMENTS
SOMEONE WILL CALL YOU FROM [facility] SCHEDULING TO SET UP A VENOUS DOPPLER ULTRASOUND OF YOUR LEFT LEG PRIOR TO YOUR FOLLOW UP WITH DR. CAMPBELL

## 2022-08-11 NOTE — OP NOTE
Lita Sparks  8/11/2022     PREOPERATIVE DIAGNOSIS: Stasis edema with ulcer of both lower extremities (HCC) [I87.313, L97.919, L97.929]  Venous insufficiency of both lower extremities [I87.2]  Stasis dermatitis of both legs [I87.2]     POSTOPERATIVE DIAGNOSIS: Post-Op Diagnosis Codes:     * Stasis edema with ulcer of both lower extremities (HCC) [I87.313, L97.919, L97.929]     * Venous insufficiency of both lower extremities [I87.2]     * Stasis dermatitis of both legs [I87.2]     PROCEDURE PERFORMED:  1.  Ultrasound-guided access of the left greater saphenous vein  2.  Radiofrequency ablation of the left greater saphenous vein     SURGEON: Cade Ko MD     ANESTHESIA: General.    PREPARATION: Routine.    STAFF: Circulator: Randi Ro RN  Scrub Person: Rashida Mehta  Assistant: Mary Lou Carrera  Vascular Ultrasound Technician: Caryn Llanos    Estimated Blood Loss: minimal    SPECIMENS: None    COMPLICATIONS: None apparent    INDICATIONS: Lita Sparks is a 61 y.o. female who we are currently following for lower extremity venous insufficiency and venous congestion of the bilateral lower legs.  She returns today after having undergone recent right lower extremity greater saphenous vein radiofrequency ablation.  She tolerated the procedure well.  Venous duplex done today which I reviewed of the right lower extremity shows successful closure of the right greater saphenous vein from just beyond the saphenofemoral junction through the mid calf.  There was no DVT.  She notes less heaviness and edema of the right lower extremity unless venous congestion with less purplish discoloration of the toes.  She does continue to have symptoms in the left lower extremity and her previous venous duplex does show significant reflux in that left greater saphenous vein. The indications, risks, and possible complications of the procedure were explained to the patient, who voiced understanding and wished to  proceed with surgery.     PROCEDURE IN DETAIL: The patient was taken to the operating room and placed on the operating table in a supine position. After MAC anesthesia was obtained, the left lower extremity was prepped and draped in a sterile manner.  Under ultrasound guidance and using a micropuncture technique the left lower extremity greater saphenous vein was cannulated and a microwire was placed.  This was confirmed under ultrasound.  A small stab incision was made with 11 blade and a 7 Irish sheath was placed.  The patient was placed in Trendelenburg position and the saphenofemoral junction was identified under ultrasound.  The catheter was placed up to the junction and pulled back 3 cm and marked.  Next, tumescent fluid was instilled along the entire length of the vein under ultrasound guidance.  Once sufficient tumescent fluid was placed radio frequency ablation had commenced.  There was a total of 7 RF cycles for a total treatment length of 89.5 cm for a total treatment time of 2 minutes and 20 seconds.  There was an average of 16 W at an average temperature 120°C.  Upon completion of the ablation the catheter and sheath were removed.  Direct pressure was held for an additional 5-10 minutes of ensure hemostasis.  An Ace wrap was placed from the toes to the groin.  Sterile dressings were applied. The patient tolerated the procedure well. Sponge and needle counts were correct. The patient was then awakened and transferred to the outpatient center in stable condition.

## 2022-08-11 NOTE — ANESTHESIA POSTPROCEDURE EVALUATION
Patient: Lita Sparks    Procedure Summary     Date: 08/11/22 Room / Location: [facility] OR  / [facility] HYBRID OR 12    Anesthesia Start: 0832 Anesthesia Stop: 0913    Procedure: LEFT LOWER EXTREMITY SAPHENOUS VEIN RADIO FREQUENCY ABLATION (Left Leg Lower) Diagnosis:       Stasis edema with ulcer of both lower extremities (HCC)      Venous insufficiency of both lower extremities      Stasis dermatitis of both legs      (Stasis edema with ulcer of both lower extremities (HCC) [I87.313, L97.919, L97.929])      (Venous insufficiency of both lower extremities [I87.2])      (Stasis dermatitis of both legs [I87.2])    Surgeons: Cade Ko MD Provider: Bennett Adams CRNA    Anesthesia Type: MAC ASA Status: 3          Anesthesia Type: MAC    Vitals  Vitals Value Taken Time   /82 08/11/22 0911   Temp     Pulse 86 08/11/22 0913   Resp     SpO2 90 % 08/11/22 0913   Vitals shown include unvalidated device data.        Post Anesthesia Care and Evaluation    Patient location during evaluation: PHASE II  Patient participation: complete - patient participated  Level of consciousness: awake, awake and alert and responsive to verbal stimuli  Pain management: adequate    Airway patency: patent  Anesthetic complications: No anesthetic complications    Cardiovascular status: acceptable, hemodynamically stable and stable  Respiratory status: acceptable and spontaneous ventilation  Hydration status: acceptable

## 2022-08-12 ENCOUNTER — HOSPITAL ENCOUNTER (OUTPATIENT)
Dept: PULMONOLOGY | Facility: HOSPITAL | Age: 62
Discharge: HOME | End: 2022-08-12
Admitting: NURSE PRACTITIONER

## 2022-08-12 DIAGNOSIS — J41.0 SIMPLE CHRONIC BRONCHITIS: ICD-10-CM

## 2022-08-12 DIAGNOSIS — I50.812 CHRONIC RIGHT-SIDED HEART FAILURE: ICD-10-CM

## 2022-08-12 DIAGNOSIS — I27.20 PULMONARY HYPERTENSION: ICD-10-CM

## 2022-08-12 PROCEDURE — 94726 PLETHYSMOGRAPHY LUNG VOLUMES: CPT

## 2022-08-12 PROCEDURE — 94060 EVALUATION OF WHEEZING: CPT

## 2022-08-12 PROCEDURE — 94060 EVALUATION OF WHEEZING: CPT | Performed by: INTERNAL MEDICINE

## 2022-08-12 PROCEDURE — 94726 PLETHYSMOGRAPHY LUNG VOLUMES: CPT | Performed by: INTERNAL MEDICINE

## 2022-08-12 RX ORDER — ALBUTEROL SULFATE 2.5 MG/3ML
2.5 SOLUTION RESPIRATORY (INHALATION) ONCE
Status: COMPLETED | OUTPATIENT
Start: 2022-08-12 | End: 2022-08-12

## 2022-08-12 RX ADMIN — ALBUTEROL SULFATE 2.5 MG: 2.5 SOLUTION RESPIRATORY (INHALATION) at 13:56

## 2022-08-18 ENCOUNTER — TELEPHONE (OUTPATIENT)
Dept: VASCULAR SURGERY | Facility: CLINIC | Age: 62
End: 2022-08-18

## 2022-08-19 ENCOUNTER — OFFICE VISIT (OUTPATIENT)
Dept: VASCULAR SURGERY | Facility: CLINIC | Age: 62
End: 2022-08-19

## 2022-08-19 ENCOUNTER — HOSPITAL ENCOUNTER (OUTPATIENT)
Dept: ULTRASOUND IMAGING | Facility: HOSPITAL | Age: 62
Discharge: HOME | End: 2022-08-19
Admitting: SURGERY

## 2022-08-19 VITALS
HEART RATE: 105 BPM | WEIGHT: 153 LBS | DIASTOLIC BLOOD PRESSURE: 84 MMHG | BODY MASS INDEX: 28.89 KG/M2 | OXYGEN SATURATION: 93 % | HEIGHT: 61 IN | SYSTOLIC BLOOD PRESSURE: 140 MMHG

## 2022-08-19 DIAGNOSIS — I83.11 VARICOSE VEINS OF BOTH LOWER EXTREMITIES WITH INFLAMMATION: ICD-10-CM

## 2022-08-19 DIAGNOSIS — I83.12 VARICOSE VEINS OF BOTH LOWER EXTREMITIES WITH INFLAMMATION: Primary | ICD-10-CM

## 2022-08-19 DIAGNOSIS — I87.2 STASIS DERMATITIS OF BOTH LEGS: ICD-10-CM

## 2022-08-19 DIAGNOSIS — E78.2 MIXED HYPERLIPIDEMIA: ICD-10-CM

## 2022-08-19 DIAGNOSIS — I87.2 VENOUS INSUFFICIENCY OF BOTH LOWER EXTREMITIES: ICD-10-CM

## 2022-08-19 DIAGNOSIS — I83.12 VARICOSE VEINS OF BOTH LOWER EXTREMITIES WITH INFLAMMATION: ICD-10-CM

## 2022-08-19 DIAGNOSIS — I87.2 VENOUS INSUFFICIENCY: ICD-10-CM

## 2022-08-19 DIAGNOSIS — I83.11 VARICOSE VEINS OF BOTH LOWER EXTREMITIES WITH INFLAMMATION: Primary | ICD-10-CM

## 2022-08-19 DIAGNOSIS — I10 PRIMARY HYPERTENSION: ICD-10-CM

## 2022-08-19 PROCEDURE — 93971 EXTREMITY STUDY: CPT

## 2022-08-19 PROCEDURE — 99214 OFFICE O/P EST MOD 30 MIN: CPT | Performed by: SURGERY

## 2022-08-19 PROCEDURE — 93971 EXTREMITY STUDY: CPT | Performed by: SURGERY

## 2022-08-19 NOTE — PROGRESS NOTES
08/19/2022      Ruma Jenkins APRN  [address] [facility] [address]        Lita Sparks  1960    Chief Complaint   Patient presents with   • Post-op     1 week follow-up from LEFT LOWER EXTREMITY SAPHENOUS VEIN RADIO FREQUENCY ABLATION with testing.  Pt denies any stroke like symptoms.  Pt states she is doing well after the surgery.  Pt denies any stroke like symptoms.        Dear Ruma Jenkins APRN:    HPI     I had the pleasure of seeing your patient in the office today for follow up.  As you recall, the patient is a 61 y.o. female who we are currently following for lower extremity venous insufficiency.  She had previously undergone right lower extremity greater saphenous vein radiofrequency ablation with good result due to significant reflux despite ongoing conservative management with compression, leg elevation, and exercise.  Now more recently she had left lower extremity greater saphenous vein radiofrequency ablation about a week ago again for ongoing reflux symptoms with edema, heaviness, and discomfort despite continued conservative management.  She tolerated that procedure very well.  She had a left lower extremity venous duplex done today which I did personally review.  It shows no evidence of DVT and successful closure of the left greater saphenous vein about 2.6 cm distal to the saphenofemoral junction through the mid calf.  She has continued wearing compression post procedure as instructed in her reflux symptoms are significantly improved with less edema, heaviness, and discomfort.  She otherwise continues without any arterial symptoms with no claudication or rest pain.  She has no other acute complaints today.      Review of Systems   Constitutional: Negative.  Negative for activity change, appetite change, chills, diaphoresis, fatigue and fever.   HENT: Negative.  Negative for congestion, sneezing, sore throat and trouble swallowing.    Eyes: Negative.  Negative for visual  "disturbance.   Respiratory: Negative.  Negative for chest tightness and shortness of breath.    Cardiovascular: Positive for leg swelling (Minimal). Negative for chest pain and palpitations.   Gastrointestinal: Negative.  Negative for abdominal distention, abdominal pain, nausea and vomiting.   Endocrine: Negative.    Genitourinary: Negative.    Musculoskeletal: Negative.    Skin: Negative.         Previous venous congestion in the lower legs is improved after bilateral greater saphenous vein radiofrequency ablations.   Allergic/Immunologic: Negative.    Neurological: Negative.    Hematological: Negative.    Psychiatric/Behavioral: Negative.        /84 (BP Location: Left arm, Patient Position: Sitting, Cuff Size: Adult)   Pulse 105   Ht 154.9 cm (61\")   Wt 69.4 kg (153 lb)   SpO2 93%   BMI 28.91 kg/m²   Physical Exam  Vitals reviewed.   Constitutional:       Appearance: Normal appearance.   HENT:      Head: Normocephalic and atraumatic.      Nose: Nose normal.      Mouth/Throat:      Mouth: Mucous membranes are moist.   Eyes:      Extraocular Movements: Extraocular movements intact.      Pupils: Pupils are equal, round, and reactive to light.   Cardiovascular:      Rate and Rhythm: Normal rate and regular rhythm.      Pulses:           Carotid pulses are 2+ on the right side and 2+ on the left side.       Radial pulses are 2+ on the right side and 2+ on the left side.        Femoral pulses are 2+ on the right side and 2+ on the left side.       Popliteal pulses are 2+ on the right side and 2+ on the left side.        Dorsalis pedis pulses are 2+ on the right side and 2+ on the left side.        Posterior tibial pulses are 2+ on the right side and 2+ on the left side.      Comments: She has some edema of the bilateral lower extremities from ankles to knees.  This is improved bilaterally after her saphenous vein radiofrequency ablations.  She was not wearing her compression in the office today.  Pulmonary: "      Effort: Pulmonary effort is normal. No respiratory distress.   Abdominal:      General: There is no distension.      Palpations: Abdomen is soft. There is no mass.      Tenderness: There is no abdominal tenderness.   Musculoskeletal:         General: Normal range of motion.      Cervical back: Normal range of motion and neck supple.      Right lower leg: Edema present.      Left lower leg: Edema present.   Skin:     General: Skin is warm and dry.      Capillary Refill: Capillary refill takes less than 2 seconds.   Neurological:      General: No focal deficit present.      Mental Status: She is alert and oriented to person, place, and time.   Psychiatric:         Mood and Affect: Mood normal.         Behavior: Behavior normal.         Thought Content: Thought content normal.         Judgment: Judgment normal.         DIAGNOSTIC DATA:    Full Pulmonary Function Test With Bronchodilator    Result Date: 8/13/2022  Narrative: [facility] - Pulmonary Function Test [address] [place] [address]  [id number] [phone] Patient : Lita Sparks MRN : [id number] CSN : [id number] Pulmonologist : Sina Gregory MD Date : 8/13/2022 ______________________________________________________________________ Interpretation : 1.  Spirometry suggests a mild restrictive ventilatory defect.  There is also a decrease FEF Max and a mild decrease in midflows. 2.  There is improvement in spirometry postbronchodilator.  Postbronchodilator spirometry just reveals a mild decrease in FEF Max and otherwise is within normal limits. 3.  Lung volumes reveal a decrease in expiratory reserve volume and a mild decrease in vital capacity and otherwise are within normal limits. Sina Gregory MD     US Guided Vascular Access    Result Date: 8/11/2022  Narrative: History: Swelling  Comments: Grayscale imaging as well as color flow duplex were used to evaluate the left lower extremity greater saphenous vein during venous closure.  The  greater saphenous vein was successfully cannulated just below the knee. The catheter was placed up to the junction and pulled back 3 cm and marked.      Impression: Successful endovenous closure of the left lower extremity greater saphenous vein. This report was finalized on 08/11/2022 15:56 by Dr. Harvinder Morgan MD.    US Venous Doppler Lower Extremity Left (duplex)    Result Date: 8/19/2022  Narrative: History: Swelling      Impression: Impression: 1. There is no evidence of deep venous thrombosis of the left lower extremity. 2. The greater saphenous vein is closed from zones 2 through 7.  Comments: Left lower extremity venous duplex exam was performed using color Doppler flow, Doppler wave form analysis, and grayscale imaging, with and without compression. There is no evidence of deep venous thrombosis of the common femoral, superficial femoral, popliteal, posterior tibial, and peroneal veins. There is no thrombus identified in the saphenofemoral junction.  This report was finalized on 08/19/2022 15:48 by Dr. Harvinder Morgan MD.      Patient Active Problem List   Diagnosis   • Cough   • Esophageal reflux   • Hyperthyroidism   • Arthritis   • Pharyngoesophageal dysphagia   • Smoker unmotivated to quit   • KWAN (dyspnea on exertion)   • Vaginitis and vulvovaginitis   • Chest heaviness   • Oxygen desaturation during sleep   • Simple chronic bronchitis (HCC)   • Tobacco abuse counseling   • Hypokalemia on potassium and due for repeat labs   • Venous insufficiency   • Primary hypertension   • Mixed hyperlipidemia   • Nonrheumatic tricuspid valve regurgitation   • Chronic right-sided heart failure (HCC)   • Varicose veins of both lower extremities with inflammation   • Pulmonary hypertension (HCC)   • Stasis edema with ulcer of both lower extremities (HCC)         ICD-10-CM ICD-9-CM   1. Varicose veins of both lower extremities with inflammation  I83.11 454.1    I83.12    2. Venous insufficiency  I87.2 459.81   3.  Mixed hyperlipidemia  E78.2 272.2   4. Primary hypertension  I10 401.9       Lab Frequency Next Occurrence   Follow Anesthesia Guidelines / Protocol Once 06/20/2022   Obtain Informed Consent Once 06/25/2022   Provide NPO Instructions to Patient Once 06/25/2022   Chlorhexidine Skin Prep Once 06/25/2022   COVID-19,APTIMA PANTHER,PAD IN-HOUSE,NP/OP/NASAL SWAB IN UTM/VTM/SALINE/LIQUID AMIES TRANSPORT MEDIA/NP WASH OR ASPIRATE, 24 HR TAT - Swab, Nasal Cavity Once 08/16/2022   Follow Anesthesia Guidelines / Protocol Once 07/15/2022   Provide NPO Instructions to Patient Once 07/20/2022   Chlorhexidine Skin Prep Once 07/20/2022   Obtain Informed Consent Once 07/20/2022       PLAN: After thoroughly evaluating Lita Sparks, I believe the best course of action is to remain conservative from a vascular standpoint.  She has now done well after bilateral lower extremity greater saphenous vein radiofrequency ablations, most recently on the left side.  Her reflux symptoms are significantly improved post procedure.  Venous duplex of the left lower extremity done today shows successful closure of the left greater saphenous vein 2.6 cm distal to the saphenofemoral junction through the mid calf.  There was no evidence of DVT.  At this point plan moving forward will be to continue with compression, leg elevation, and exercise to help reduce any residual reflux symptoms that may persist.  Otherwise I will plan to see her back in the office in 6 months for ongoing surveillance and care.  The patient is to continue taking their medications as previously discussed.   I did discuss vascular risk factors as they pertain to the progression of vascular disease including controlling tension, and hyperlipidemia.  Her blood pressure is somewhat elevated today with a systolic value of 140.  I will have her follow with her primary care physician for further evaluation of her antihypertensive regimen. BMI is >= 25 and <30. (Overweight) The  following options were offered after discussion;: exercise counseling/recommendations and nutrition counseling/recommendations. I did  extensively on smoking cessation, and the patient was advised of the continued risks of smoking.  I provided over 10 minutes counseling on this matter. This was all discussed in full with complete understanding.  Thank you for allowing me to participate in the care of your patient.  Please do not hesitate to call with any questions or concerns.  We will keep you aware of any further encounters with Lita Sparks.      Sincerely Yours,      Cade Ko MD

## 2022-10-19 ENCOUNTER — OFFICE VISIT (OUTPATIENT)
Dept: CARDIOLOGY | Facility: CLINIC | Age: 62
End: 2022-10-19

## 2022-10-19 VITALS
DIASTOLIC BLOOD PRESSURE: 82 MMHG | HEIGHT: 61 IN | SYSTOLIC BLOOD PRESSURE: 126 MMHG | OXYGEN SATURATION: 95 % | HEART RATE: 65 BPM | WEIGHT: 152 LBS | BODY MASS INDEX: 28.7 KG/M2

## 2022-10-19 DIAGNOSIS — I36.1 NONRHEUMATIC TRICUSPID VALVE REGURGITATION: ICD-10-CM

## 2022-10-19 DIAGNOSIS — R94.2 ABNORMAL PFT: ICD-10-CM

## 2022-10-19 DIAGNOSIS — J41.0 SIMPLE CHRONIC BRONCHITIS: ICD-10-CM

## 2022-10-19 DIAGNOSIS — I10 PRIMARY HYPERTENSION: ICD-10-CM

## 2022-10-19 DIAGNOSIS — I50.812 CHRONIC RIGHT-SIDED HEART FAILURE: Primary | ICD-10-CM

## 2022-10-19 DIAGNOSIS — E05.90 HYPERTHYROIDISM: ICD-10-CM

## 2022-10-19 DIAGNOSIS — I27.20 PULMONARY HYPERTENSION: ICD-10-CM

## 2022-10-19 DIAGNOSIS — E78.2 MIXED HYPERLIPIDEMIA: ICD-10-CM

## 2022-10-19 DIAGNOSIS — F17.200 SMOKER UNMOTIVATED TO QUIT: ICD-10-CM

## 2022-10-19 PROCEDURE — 99214 OFFICE O/P EST MOD 30 MIN: CPT | Performed by: NURSE PRACTITIONER

## 2022-10-19 NOTE — PROGRESS NOTES
Chief Complaint  Congestive Heart Failure (3mo F/U. Increased Metoprolol LOV) and Results (PFT)    Subjective          Lita Sparks presents to [id number] for routine follow-up of outpatient testing and medication adjustment.  Metoprolol tartrate was increased to 25 mg twice daily at her last office visit on 7/14/2022.  PFTs on 8/13/2022 revealed mild restrictive ventilatory defect with improvement postbronchodilator and decrease in expiratory reserve volume and vital capacity.  She has chronic right-sided heart failure, moderate to severe pulmonary hypertension, hypertension, hyperlipidemia, COPD, GERD, hyperthyroidism, previous cerebrovascular accident, venous insufficiency s/p right saphenous vein ablation 7/7/22 and tobacco use. She continues to complain of dyspnea with mild exertion. She reports chronic orthopnea requiring two pillows to sleep. She reports chronic intermittent bilateral lower extremity edema as well, however she states this has improved recently. She reports intermittent palpitations and dizziness. Patient denies chest pain, syncope, PND or decreased stamina.  Patient denies any signs of bleeding.    Hypertension  This is a chronic problem. The current episode started more than 1 year ago. The problem is uncontrolled. Associated symptoms include orthopnea, palpitations, peripheral edema and shortness of breath. Pertinent negatives include no anxiety, blurred vision, chest pain, headaches, malaise/fatigue, neck pain, PND or sweats. Risk factors for coronary artery disease include post-menopausal state, dyslipidemia and smoking/tobacco exposure. Current antihypertension treatment includes diuretics and beta blockers. The current treatment provides significant improvement. Hypertensive end-organ damage includes CVA. Identifiable causes of hypertension include a thyroid problem.   Hyperlipidemia  This is a chronic problem. The current episode started more than  "1 year ago. Associated symptoms include shortness of breath. Pertinent negatives include no chest pain. Current antihyperlipidemic treatment includes statins. Risk factors for coronary artery disease include post-menopausal, hypertension and dyslipidemia.     I have reviewed and confirmed the accuracy of the ROS  JAMIL Alvarez    Objective   Vital Signs:   /82   Pulse 65   Ht 154.9 cm (61\")   Wt 68.9 kg (152 lb)   SpO2 95%   BMI 28.72 kg/m²     Vitals and nursing note reviewed.   Constitutional:       General: Not in acute distress.     Appearance: Normal and healthy appearance. Well-developed, overweight and not in distress. Not diaphoretic.   Eyes:      General: Lids are normal.         Right eye: No discharge.         Left eye: No discharge.      Conjunctiva/sclera: Conjunctivae normal.      Pupils: Pupils are equal, round, and reactive to light.   HENT:      Head: Normocephalic and atraumatic.      Jaw: There is normal jaw occlusion.      Right Ear: External ear normal.      Left Ear: External ear normal.      Nose: Nose normal.   Neck:      Thyroid: No thyromegaly.      Vascular: No carotid bruit, JVD or JVR. JVD normal.      Trachea: Trachea normal. No tracheal deviation.   Pulmonary:      Effort: Pulmonary effort is normal. No respiratory distress.      Breath sounds: Examination of the right-lower field reveals decreased breath sounds. Examination of the left-lower field reveals decreased breath sounds. Decreased breath sounds present. No wheezing. No rhonchi. No rales.   Chest:      Chest wall: Not tender to palpatation.   Cardiovascular:      PMI at left midclavicular line. Normal rate. Regular rhythm. Normal S1. Normal S2.      Murmurs: There is a systolic murmur. There is a diastolic murmur.      No gallop. No click. No rub.   Pulses:     Intact distal pulses. No decreased pulses.   Edema:     Peripheral edema absent.   Abdominal:      General: Bowel sounds are normal. There is no " distension.      Palpations: Abdomen is soft.      Tenderness: There is no abdominal tenderness.   Musculoskeletal: Normal range of motion.         General: No tenderness or deformity.      Cervical back: Normal range of motion and neck supple. Skin:     General: Skin is warm and dry.      Capillary Refill: Capillary refill takes more than 3 seconds.      Coloration: Skin is cyanotic. Skin is not pale.      Findings: No erythema or rash.   Neurological:      General: No focal deficit present.      Mental Status: Alert, oriented to person, place, and time and oriented to person, place and time.   Psychiatric:         Attention and Perception: Attention and perception normal.         Mood and Affect: Mood and affect normal.         Speech: Speech normal.         Behavior: Behavior normal.         Thought Content: Thought content normal.         Cognition and Memory: Cognition and memory normal.         Judgment: Judgment normal.        Result Review :   The following data was reviewed by: JAMIL Alvarez on 10/19/2022:  Common labs    Common Labs 7/5/22 7/5/22 8/9/22 8/9/22    1240 1240 1353 1353   Glucose  98  69   BUN  11  13   Creatinine  0.86  1.13 (A)   Sodium  139  141   Potassium  4.1  3.4 (A)   Chloride  103  103   Calcium  9.5  9.8   WBC 9.14  9.75    Hemoglobin 13.6  16.5 (A)    Hematocrit 42.2  50.1 (A)    Platelets 312  312    (A) Abnormal value            Data reviewed: Cardiology studies 2d echo 2/23/22 and 7/6/22         Assessment and Plan    Diagnoses and all orders for this visit:    1. Chronic right-sided heart failure (HCC)-NYHA class III.  Compensated. Reviewed signs and symptoms of CHF and what to report with the patient. Patient instructed to restrict sodium and weigh daily. Report weight gain of greater than 2 lbs overnight or 5 lbs in 1 week. Pt verbalized understanding of instructions and plan of care.  Continue metoprolol tartrate and Lasix.    2. Nonrheumatic tricuspid valve  regurgitation (Primary)- mild on 2D echo 7/6/2022.    3. Primary hypertension-blood pressure is well controlled. Continue losartan, metoprolol tartrate and HCTZ.  Monitor and record daily blood pressure. Report readings consistently higher than 130/90 or consistently lower than 100/60.     4. Mixed hyperlipidemia- management per PCP.  Continue atorvastatin.    5. Hyperthyroidism-patient follows with endocrinology.  Stable.    6. Simple chronic bronchitis (HCC)-stable on room air.    7. Smoker unmotivated to quit- Lita Sparks  reports that she has been smoking cigarettes. She has a 23.50 pack-year smoking history. She has never used smokeless tobacco.. I have educated her on the risk of diseases from using tobacco products such as cancer, COPD and heart disease. I advised her to quit and she is not willing to quit. I spent 3  minutes counseling the patient.    8. Pulmonary hypertension (HCC)-moderate to severe on 2D echo 7/6/2022.  Abnormal PFTs.  Referral to pulmonology.     Follow Up   Return in about 3 months (around 1/19/2023) for Next scheduled follow up.  Patient was given instructions and counseling regarding her condition or for health maintenance advice. Please see specific information pulled into the AVS if appropriate.

## 2022-11-09 ENCOUNTER — TELEPHONE (OUTPATIENT)
Dept: VASCULAR SURGERY | Facility: CLINIC | Age: 62
End: 2022-11-09

## 2022-11-09 ENCOUNTER — PATIENT MESSAGE (OUTPATIENT)
Dept: VASCULAR SURGERY | Facility: CLINIC | Age: 62
End: 2022-11-09

## 2022-11-16 ENCOUNTER — TELEPHONE (OUTPATIENT)
Dept: VASCULAR SURGERY | Facility: CLINIC | Age: 62
End: 2022-11-16

## 2022-12-06 ENCOUNTER — OFFICE VISIT (OUTPATIENT)
Dept: PULMONOLOGY | Facility: CLINIC | Age: 62
End: 2022-12-06

## 2022-12-06 VITALS
HEIGHT: 61 IN | HEART RATE: 73 BPM | DIASTOLIC BLOOD PRESSURE: 76 MMHG | SYSTOLIC BLOOD PRESSURE: 134 MMHG | OXYGEN SATURATION: 93 % | WEIGHT: 148.8 LBS | BODY MASS INDEX: 28.09 KG/M2

## 2022-12-06 DIAGNOSIS — Z72.0 TOBACCO ABUSE: ICD-10-CM

## 2022-12-06 DIAGNOSIS — J44.1 COPD WITH ACUTE EXACERBATION: Primary | ICD-10-CM

## 2022-12-06 DIAGNOSIS — Z99.81 DEPENDENCE ON SUPPLEMENTAL OXYGEN: ICD-10-CM

## 2022-12-06 DIAGNOSIS — G47.33 OSA (OBSTRUCTIVE SLEEP APNEA): ICD-10-CM

## 2022-12-06 DIAGNOSIS — J98.4 RESTRICTIVE LUNG DISEASE: ICD-10-CM

## 2022-12-06 DIAGNOSIS — R06.09 DOE (DYSPNEA ON EXERTION): ICD-10-CM

## 2022-12-06 DIAGNOSIS — J30.89 NON-SEASONAL ALLERGIC RHINITIS, UNSPECIFIED TRIGGER: ICD-10-CM

## 2022-12-06 DIAGNOSIS — J44.9 CHRONIC OBSTRUCTIVE PULMONARY DISEASE, UNSPECIFIED COPD TYPE: ICD-10-CM

## 2022-12-06 DIAGNOSIS — G47.34 OXYGEN DESATURATION DURING SLEEP: ICD-10-CM

## 2022-12-06 DIAGNOSIS — I27.20 PULMONARY HYPERTENSION: ICD-10-CM

## 2022-12-06 PROCEDURE — 99204 OFFICE O/P NEW MOD 45 MIN: CPT | Performed by: INTERNAL MEDICINE

## 2022-12-06 PROCEDURE — 99406 BEHAV CHNG SMOKING 3-10 MIN: CPT | Performed by: INTERNAL MEDICINE

## 2022-12-06 RX ORDER — ALBUTEROL SULFATE 90 UG/1
2 AEROSOL, METERED RESPIRATORY (INHALATION) EVERY 4 HOURS PRN
Qty: 6.7 G | Refills: 5 | Status: SHIPPED | OUTPATIENT
Start: 2022-12-06 | End: 2023-03-14 | Stop reason: SDUPTHER

## 2022-12-06 RX ORDER — CLONIDINE HYDROCHLORIDE 0.1 MG/1
1 TABLET ORAL 2 TIMES DAILY PRN
COMMUNITY

## 2022-12-06 RX ORDER — FLUTICASONE PROPIONATE 50 MCG
2 SPRAY, SUSPENSION (ML) NASAL DAILY
Qty: 16 G | Refills: 5 | Status: SHIPPED | OUTPATIENT
Start: 2022-12-06 | End: 2023-03-14 | Stop reason: SDUPTHER

## 2022-12-06 RX ORDER — LORATADINE 10 MG/1
10 TABLET ORAL DAILY
Qty: 90 TABLET | Refills: 3 | Status: SHIPPED | OUTPATIENT
Start: 2022-12-06

## 2022-12-06 RX ORDER — HYDROCODONE BITARTRATE AND ACETAMINOPHEN 7.5; 325 MG/1; MG/1
1 TABLET ORAL EVERY 8 HOURS PRN
COMMUNITY
End: 2023-01-18

## 2022-12-06 RX ORDER — FLUTICASONE PROPIONATE AND SALMETEROL 250; 50 UG/1; UG/1
1 POWDER RESPIRATORY (INHALATION) 2 TIMES DAILY
Qty: 1 EACH | Refills: 11 | Status: SHIPPED | OUTPATIENT
Start: 2022-12-06 | End: 2023-03-14 | Stop reason: SDUPTHER

## 2022-12-06 RX ORDER — AZELASTINE HYDROCHLORIDE 137 UG/1
2 SPRAY, METERED NASAL 2 TIMES DAILY
Qty: 30 ML | Refills: 5 | Status: SHIPPED | OUTPATIENT
Start: 2022-12-06

## 2022-12-06 RX ORDER — ALBUTEROL SULFATE 90 UG/1
2 AEROSOL, METERED RESPIRATORY (INHALATION) EVERY 6 HOURS PRN
Qty: 19 G | Refills: 5 | Status: SHIPPED | OUTPATIENT
Start: 2022-12-06 | End: 2023-01-18 | Stop reason: SDUPTHER

## 2022-12-06 RX ORDER — MELOXICAM 15 MG/1
1 TABLET ORAL DAILY
COMMUNITY

## 2022-12-06 NOTE — PROGRESS NOTES
RESPIRATORY DISEASE CLINIC NEW CONSULT NOTE     Patient: Lita Sparks      :  1960   Age: 62 y.o.    Date of Service: 2022      Subjective:   Requesting Physician: Ruma Jenkins APRN     Reason for Consultation:    Diagnosis Plan   1. COPD with acute exacerbation (HCC)  albuterol sulfate  (90 Base) MCG/ACT inhaler      2. Chronic obstructive pulmonary disease, unspecified COPD type (HCC)        3. Oxygen desaturation during sleep        4. KWAN (dyspnea on exertion)        5. Pulmonary hypertension (HCC)        6. Tobacco abuse        7. Non-seasonal allergic rhinitis, unspecified trigger        8. OSCAR (obstructive sleep apnea)        9. Dependence on supplemental oxygen        10. Restrictive lung disease             Chief Complaint:     Chief Complaint   Patient presents with   • abnormal pft      Had flu 3 weeks ago; ins won't cover stiolto        History of present illness: Lita Sparks is a 62 y.o. female who presents to the office today to be seen for    Diagnosis Plan   1. COPD with acute exacerbation (HCC)  albuterol sulfate  (90 Base) MCG/ACT inhaler      2. Chronic obstructive pulmonary disease, unspecified COPD type (HCC)        3. Oxygen desaturation during sleep        4. KWAN (dyspnea on exertion)        5. Pulmonary hypertension (HCC)        6. Tobacco abuse        7. Non-seasonal allergic rhinitis, unspecified trigger        8. OSCAR (obstructive sleep apnea)        9. Dependence on supplemental oxygen        10. Restrictive lung disease           Other problems per record.  Patient is a very pleasant middle aged  female who was seen in the pulmonary clinic as a new consult today.  She was referred to the pulmonary clinic by Dr. Tacho Aguilar cardiologist.    She is an active smoker and continues to smoke.  Rarely smokes 1 pack/day.  She has diagnosis of chronic obstructive pulmonary disease but did not have any pulmonary function test done.  She  reported she had history of obstructive sleep apnea and a sleep study was done many years ago but she never had a CPAP set up at home.  She has some sleep disturbance and also gets short of breath on exertion.  She has history of chronic congestive diastolic heart failure and also has some valvular heart disease and follows up with Dr. Tacho Aguilar the cardiologist.    Patient has oxygen at night 2 L which she has been using for last 2 years.  She used albuterol rescue inhaler and Stiolto Respimat which was started by the primary care provider.  She did not have these medications and ran out 3 weeks ago when she had flu and currently getting more short of breath.  She lived in [place] for many years and moved to [place] about 15 months ago and wanted to establish care with our office.  She reported having some sleep disturbances.  Patient did not take COVID-vaccine and wanted to take influenza vaccine.  She has no recent hospitalizations and ER visit or urgent care visit.  Patient lives on her own with her sister.  She has grownup children and she is  and her   many years ago.    In reviewing the records patient did not have any recent imaging studies done.  She takes furosemide for heart failure.  She has oxygen monitor at home and she reports her oxygen saturation is well-maintained in the lower or mid 90s.    Past History  Past Medical History:   Diagnosis Date   • Arthritis    • Bronchitis    • Cataracts, bilateral    • CHF (congestive heart failure) ([facility])    • COPD (chronic obstructive pulmonary disease) ([facility]ly [facility])    • Dizziness    • GERD (gastroesophageal reflux disease)    • Hyperlipemia    • Hypertension    • Stroke ([facility])    • UTI (urinary tract infection)      Past Surgical History:   Procedure Laterality Date   • CHOLECYSTECTOMY     • COLONOSCOPY     • ENDOSCOPY     • SHOULDER SURGERY Right    • VARICOSE VEIN SURGERY Right 2022    Procedure: RIGHT LOWER EXTREMITY SAPHENOUS VEIN RADIO  FREQUENCY ABLATION;  Surgeon: Cade Ko MD;  Location:  [facility];  Service: Vascular;  Laterality: Right;   • VARICOSE VEIN SURGERY Left 08/11/2022    Procedure: LEFT LOWER EXTREMITY SAPHENOUS VEIN RADIO FREQUENCY ABLATION;  Surgeon: Cade Ko MD;  Location:  [facility];  Service: Vascular;  Laterality: Left;     Allergies   Allergen Reactions   • Codeine Other (See Comments)     Chest pain   • Methimazole Other (See Comments)     N/V     Current Outpatient Medications   Medication Sig Dispense Refill   • albuterol sulfate  (90 Base) MCG/ACT inhaler Inhale 2 puffs Every 6 (Six) Hours As Needed for Wheezing or Shortness of Air. 19 g 5   • amLODIPine (NORVASC) 10 MG tablet Take 10 mg by mouth Daily.     • atorvastatin (LIPITOR) 40 MG tablet 1 tablet Every Night.     • cloNIDine (CATAPRES) 0.1 MG tablet 1 tablet 2 (Two) Times a Day As Needed.     • clopidogrel (PLAVIX) 75 MG tablet 1 tablet Daily.     • furosemide (LASIX) 40 MG tablet Take 40 mg by mouth 2 (Two) Times a Day.     • hydroCHLOROthiazide (HYDRODIURIL) 12.5 MG tablet Take 12.5 mg by mouth Daily.     • HYDROcodone-acetaminophen (NORCO) 7.5-325 MG per tablet 1 tablet Every 8 (Eight) Hours As Needed.     • losartan (COZAAR) 25 MG tablet Take 1 tablet by mouth Daily. 30 tablet 11   • meloxicam (MOBIC) 15 MG tablet Take 1 tablet by mouth Daily.     • metoprolol tartrate (LOPRESSOR) 25 MG tablet Take 1 tablet by mouth 2 (Two) Times a Day. (Patient taking differently: Take 25 mg by mouth 2 (Two) Times a Day. Takes 2 tablets twice daily.) 60 tablet 11   • pantoprazole (PROTONIX) 40 MG EC tablet 1 tablet Daily.     • PARoxetine (PAXIL) 10 MG tablet 1 tablet Daily.     • potassium chloride ER (K-TAB) 20 MEQ tablet controlled-release ER tablet Take 20 mEq by mouth Daily.     • promethazine (PHENERGAN) 25 MG tablet 1 tablet As Needed.     • Stiolto Respimat 2.5-2.5 MCG/ACT aerosol solution inhaler 2 puffs Daily.    "    No current facility-administered medications for this visit.     Social History     Socioeconomic History   • Marital status:    Tobacco Use   • Smoking status: Every Day     Packs/day: 1.00     Years: 47.00     Pack years: 47.00     Types: Cigarettes     Start date: 1975   • Smokeless tobacco: Never   Vaping Use   • Vaping Use: Never used   Substance and Sexual Activity   • Alcohol use: Never   • Drug use: Never   • Sexual activity: Defer     Family History   Problem Relation Age of Onset   • No Known Problems Mother    • No Known Problems Father      Immunization History   Administered Date(s) Administered   • COVID-19 (MODERNA) 6-11 YRS 04/29/2021, 05/27/2021, 05/27/2022       Review of Systems  A complete review of systems is performed and all other systems were reviewed and negative except as noted above in the HPI.  Review of Systems   Constitutional: Positive for fatigue and unexpected weight loss.   HENT: Positive for congestion, postnasal drip and sinus pressure.    Eyes: Negative.    Respiratory: Positive for cough, chest tightness and shortness of breath.    Cardiovascular: Negative.    Gastrointestinal: Negative.    Endocrine: Negative.    Genitourinary: Negative.    Musculoskeletal: Positive for arthralgias and back pain.   Skin: Negative.    Allergic/Immunologic: Positive for environmental allergies.   Neurological: Negative.    Hematological: Negative.    Psychiatric/Behavioral: Negative.          Objective:     Vital Signs:  /76   Pulse 73   Ht 154.9 cm (61\")   Wt 67.5 kg (148 lb 12.8 oz)   LMP  (LMP Unknown)   SpO2 93%   Breastfeeding No   BMI 28.12 kg/m²     Pulmonary Functions Testing Results:    Results for orders placed during the hospital encounter of 08/12/22    Full Pulmonary Function Test With Bronchodilator    Narrative  [facility] - Pulmonary Function Test    [address] [place] [address]  [id number]  [phone]    Patient : Lita Sparks  MRN : " [id number]  [facility] : [id number]  Pulmonologist : Sina Gregory MD  Date : 8/13/2022    ______________________________________________________________________    Interpretation :  1.  Spirometry suggests a mild restrictive ventilatory defect.  There is also a decrease FEF Max and a mild decrease in midflows.  2.  There is improvement in spirometry postbronchodilator.  Postbronchodilator spirometry just reveals a mild decrease in FEF Max and otherwise is within normal limits.  3.  Lung volumes reveal a decrease in expiratory reserve volume and a mild decrease in vital capacity and otherwise are within normal limits.      Sina Gregory MD        Physical Exam  General:  Patient is a 62 y.o. pleasant middle aged  female. Looks states age. Appears to be in no acute distress.  Eyes:  EOMI.  PERRLA.  Vision intact.  No scleral icterus.    Ear, Nose, Mouth and Throat:  External inspection of the ears and nose appear to be normal.  No obvious scars or lesions.  Hearing is grossly intact.  No leukoplakia, pharyngitis, stomatitis or thrush. Swollen nasal mucosa with post nasal drip.   Neck:  Range of motion of neck normal.  No thyromegaly or masses. Malanpati Class 3, no jugular venous distention, no thyroid swelling  Respiratory:  Clear to auscultation bilaterally.  No use of accessory muscles. Decreased breath sounds.      Cardiovascular:  Regularly regular rhythm without S3, S4 or murmur.  No hepatojugular reflux nor carotid bruits.  Pulses intact in four extremities.  No obvious signs of edema.    Gastrointestinal:  Nontender, nondistended, soft.  Bowel sounds positive in all four quadrants.  No organomegaly or masses nor bruit.    Lymphatic:  No significant adenopathy appreciated in the neck, axilla or elsewhere.    Musculoskeletal:  Gait was grossly intact.  Range of motion, strength and sensitivity of all four extremities appear to be intact.  Distal tendon reflexes intact.   Skin:  No obvious rashes,  lesions, ulcers or large amount of bruising.    Neurological:  Refer to Eye, Ear, Nose and Throat and musculoskeletal exams for additional details.  Distal tendon reflexes intact.  No clonus or Babinski.  Sensation to touch is grossly intact.    Psychiatric:  Patient is alert and oriented to person, place and time.  Recent and remote memory appear to be intact.  Patient does not show outward signs of depression.      Diagnostic Findings:   Pertinent findings noted and abnormal findings also noted.Reviewed .      Chest Imaging  None available.    Assessment      1. COPD with acute exacerbation (HCC)    2. Chronic obstructive pulmonary disease, unspecified COPD type (HCC)    3. Oxygen desaturation during sleep    4. KWAN (dyspnea on exertion)    5. Pulmonary hypertension (HCC)    6. Tobacco abuse    7. Non-seasonal allergic rhinitis, unspecified trigger    8. OSCAR (obstructive sleep apnea)    9. Dependence on supplemental oxygen    10. Restrictive lung disease        Plan/Recommendations     1.  Patient has a long history of tobacco abuse and had a diagnosis of chronic obstructive pulmonary disease.  She had no recent imaging studies done.  She moved here from [place].  I ordered a CT scan of the chest noncontrast as a baseline work-up for her history of tobacco abuse for underlying COPD and she will also need monitoring for lung nodules in the long-term as per current guidelines  2.  She was Stiolto Respimat in the past which she ran out 3 weeks ago and this is no longer covered by insurance and she is anxious about it.  Advised her to start using Wixela instead and she will also continue using albuterol rescue inhalers as needed.  New prescriptions are sent to the pharmacy.  I ordered a pulmonary function test for further evaluation.  3.  Her allergy symptoms are present and she has postnasal drainage and cough with clear expectoration.  I started the patient on nasal allergy medications with Astelin nasal spray  fluticasone aspirin loratadine.  Options were sent to the pharmacy.  4.  She had history of sleep apnea in the past but never had CPAP treatment still having sleep disturbances.  I ordered a sleep study and she will need to start using CPAP if she is noted to have obstructive sleep apnea.  5. Lita Sparks  reports that she has been smoking cigarettes. She started smoking about 47 years ago. She has a 47.00 pack-year smoking history. She has never used smokeless tobacco.. I have educated her on the risk of diseases from using tobacco products such as cancer, COPD and heart disease.   I advised her to quit and she is willing to quit. We have discussed the following method/s for tobacco cessation:  Counseling.  Together we have set a quit date for 2 weeks from today.  She will follow up with me in 3 months or sooner to check on her progress.I spent 7 minutes counseling the patient.   6.  He is not vaccinated for COVID but will take the influenza vaccine.  She is advised to return to pulmonary clinic for follow-up visit after the sleep study pulmonary function test and a CT scan of the chest or earlier if needed.    Follow Up    3 months    Time Spent   3 minutes      I appreciate the opportunity of participating in this patients care. I would like to thank the PCP for the referral. Please feel free to contact me with any other questions.       Lakshmi Hall MD   Pulmonologist/Intensivist     10:37 CST     Addendum to clinical note for surgical clearance.  I was requested to get a surgical clearance for the planned upcoming surgical procedure for this patient and I just reviewed her chart today.  Based on my clinical evaluation I would give her a surgical clearance for the planned right TSA.  If you have any further questions please feel free to contact my office.    Lakshmi Hall MD  Pulmonologist/Intensivist  2/13/2023 18:36 CST

## 2022-12-08 ENCOUNTER — TELEPHONE (OUTPATIENT)
Dept: PULMONOLOGY | Facility: CLINIC | Age: 62
End: 2022-12-08

## 2022-12-08 NOTE — TELEPHONE ENCOUNTER
[place] Sleepiness Scale    Situation Chance of Dozing or Sleeping   • Sitting and reading 1 - slight chance of dosing or sleeping   • Watching TV 3 - high chance of dosing or sleeping   • Sitting inactive in a public place 1 - slight chance of dosing or sleeping   • Being a passenger in a motor vehicle for an hour or more 3 - high chance of dosing or sleeping   • Lying down in the afternoon 3 - high chance of dosing or sleeping   • Sitting and talking to someone 1 - slight chance of dosing or sleeping   • Sitting quietly after lunch (no alcohol) 1 - slight chance of dosing or sleeping   • Stopped for a few minutes in traffic while driving 0 - would never dose or sleep   Total score (add the scores up) 13

## 2022-12-15 ENCOUNTER — HOSPITAL ENCOUNTER (OUTPATIENT)
Dept: CT IMAGING | Facility: HOSPITAL | Age: 62
Discharge: HOME | End: 2022-12-15
Admitting: INTERNAL MEDICINE

## 2022-12-15 ENCOUNTER — PATIENT ROUNDING (BHMG ONLY) (OUTPATIENT)
Dept: PULMONOLOGY | Facility: CLINIC | Age: 62
End: 2022-12-15

## 2022-12-15 DIAGNOSIS — J44.1 COPD WITH ACUTE EXACERBATION: ICD-10-CM

## 2022-12-15 PROCEDURE — 71250 CT THORAX DX C-: CPT

## 2022-12-15 NOTE — PROGRESS NOTES
December 15, 2022    Hello, may I speak with Lita Sparks?    My name is Terri Cason     I am  with [facility]  [address]  [facility] [address] [phone].    Before we get started may I verify your date of birth? 1960    I am calling to officially welcome you to our practice and ask about your recent visit. Is this a good time to talk? yes    Tell me about your visit with us. What things went well?  Everything was great.  Wish all doctor visits went that well.  Staff were perfect as well as wait time.       We're always looking for ways to make our patients' experiences even better. Do you have recommendations on ways we may improve?  no    Overall were you satisfied with your first visit to our practice? yes       I appreciate you taking the time to speak with me today. Is there anything else I can do for you? no      Thank you, and have a great day.

## 2022-12-16 ENCOUNTER — TELEPHONE (OUTPATIENT)
Dept: PULMONOLOGY | Facility: CLINIC | Age: 62
End: 2022-12-16

## 2022-12-16 NOTE — TELEPHONE ENCOUNTER
----- Message from Hallie Carrera sent at 12/14/2022  3:31 PM CST -----  [facility] uses 958.11 for split night  Need order

## 2022-12-19 DIAGNOSIS — G47.33 OSA (OBSTRUCTIVE SLEEP APNEA): Primary | ICD-10-CM

## 2022-12-19 NOTE — TELEPHONE ENCOUNTER
It needs to be the one that says poly with cpap. The new order is still the same as 958.10 and we need the 958.11.   It says with cpap.   Thank you

## 2023-01-18 ENCOUNTER — OFFICE VISIT (OUTPATIENT)
Dept: CARDIOLOGY | Facility: CLINIC | Age: 63
End: 2023-01-18
Payer: MEDICARE

## 2023-01-18 VITALS
OXYGEN SATURATION: 99 % | DIASTOLIC BLOOD PRESSURE: 72 MMHG | HEART RATE: 68 BPM | SYSTOLIC BLOOD PRESSURE: 130 MMHG | HEIGHT: 61 IN | WEIGHT: 150 LBS | BODY MASS INDEX: 28.32 KG/M2

## 2023-01-18 DIAGNOSIS — Z72.0 TOBACCO ABUSE: ICD-10-CM

## 2023-01-18 DIAGNOSIS — I50.812 CHRONIC RIGHT-SIDED HEART FAILURE: Primary | ICD-10-CM

## 2023-01-18 DIAGNOSIS — I36.1 NONRHEUMATIC TRICUSPID VALVE REGURGITATION: ICD-10-CM

## 2023-01-18 DIAGNOSIS — G47.33 OSA (OBSTRUCTIVE SLEEP APNEA): ICD-10-CM

## 2023-01-18 DIAGNOSIS — J44.9 CHRONIC OBSTRUCTIVE PULMONARY DISEASE, UNSPECIFIED COPD TYPE: ICD-10-CM

## 2023-01-18 DIAGNOSIS — I10 PRIMARY HYPERTENSION: ICD-10-CM

## 2023-01-18 DIAGNOSIS — E05.90 HYPERTHYROIDISM: ICD-10-CM

## 2023-01-18 DIAGNOSIS — E78.2 MIXED HYPERLIPIDEMIA: ICD-10-CM

## 2023-01-18 DIAGNOSIS — I27.20 PULMONARY HYPERTENSION: ICD-10-CM

## 2023-01-18 PROCEDURE — 93000 ELECTROCARDIOGRAM COMPLETE: CPT | Performed by: NURSE PRACTITIONER

## 2023-01-18 PROCEDURE — 99214 OFFICE O/P EST MOD 30 MIN: CPT | Performed by: NURSE PRACTITIONER

## 2023-01-18 RX ORDER — ERGOCALCIFEROL 1.25 MG/1
50000 CAPSULE ORAL WEEKLY
COMMUNITY

## 2023-01-18 RX ORDER — ANTACID TABLETS 648 MG/1
1 TABLET, CHEWABLE ORAL DAILY
COMMUNITY
Start: 2022-12-15

## 2023-01-18 RX ORDER — LOSARTAN POTASSIUM 50 MG/1
50 TABLET ORAL DAILY
Qty: 90 TABLET | Refills: 3 | Status: SHIPPED | OUTPATIENT
Start: 2023-01-18

## 2023-01-18 RX ORDER — TRAMADOL HYDROCHLORIDE 50 MG/1
50 TABLET ORAL AS NEEDED
COMMUNITY

## 2023-01-18 NOTE — PROGRESS NOTES
Chief Complaint  Congestive Heart Failure (3mo F/U) and Hypertension    Subjective          Lita WILBER Sparks presents to [id number] for routine follow-up. She has chronic right-sided heart failure, moderate to severe pulmonary hypertension, hypertension, hyperlipidemia, COPD, GERD, hyperthyroidism, previous cerebrovascular accident, venous insufficiency s/p right saphenous vein ablation 7/7/22, COPD, restrictive lung disease and tobacco use. She continues to complain of dyspnea with mild exertion. She reports chronic orthopnea requiring two pillows to sleep. She reports chronic intermittent bilateral lower extremity edema as well, however she states this has improved recently. She reports intermittent palpitations and dizziness. Patient denies chest pain, syncope, PND or decreased stamina.  Patient denies any signs of bleeding.    Hypertension  This is a chronic problem. The current episode started more than 1 year ago. The problem is uncontrolled. Associated symptoms include orthopnea, palpitations, peripheral edema and shortness of breath. Pertinent negatives include no anxiety, blurred vision, chest pain, headaches, malaise/fatigue, neck pain, PND or sweats. Risk factors for coronary artery disease include post-menopausal state, dyslipidemia and smoking/tobacco exposure. Current antihypertension treatment includes diuretics and beta blockers. The current treatment provides significant improvement. Hypertensive end-organ damage includes CVA. Identifiable causes of hypertension include a thyroid problem.   Hyperlipidemia  This is a chronic problem. The current episode started more than 1 year ago. Associated symptoms include shortness of breath. Pertinent negatives include no chest pain. Current antihyperlipidemic treatment includes statins. Risk factors for coronary artery disease include post-menopausal, hypertension and dyslipidemia.     I have reviewed and confirmed the  "accuracy of the ROS  Carol RashidJAMIL      Objective   Vital Signs:   /72   Pulse 68   Ht 154.9 cm (61\")   Wt 68 kg (150 lb)   SpO2 99%   BMI 28.34 kg/m²     Vitals and nursing note reviewed.   Constitutional:       General: Not in acute distress.     Appearance: Normal and healthy appearance. Well-developed, overweight and not in distress. Not diaphoretic.   Eyes:      General: Lids are normal.         Right eye: No discharge.         Left eye: No discharge.      Conjunctiva/sclera: Conjunctivae normal.      Pupils: Pupils are equal, round, and reactive to light.   HENT:      Head: Normocephalic and atraumatic.      Jaw: There is normal jaw occlusion.      Right Ear: External ear normal.      Left Ear: External ear normal.      Nose: Nose normal.   Neck:      Thyroid: No thyromegaly.      Vascular: No carotid bruit, JVD or JVR. JVD normal.      Trachea: Trachea normal. No tracheal deviation.   Pulmonary:      Effort: Pulmonary effort is normal. No respiratory distress.      Breath sounds: Examination of the right-lower field reveals decreased breath sounds. Examination of the left-lower field reveals decreased breath sounds. Decreased breath sounds present. No wheezing. No rhonchi. No rales.   Chest:      Chest wall: Not tender to palpatation.   Cardiovascular:      PMI at left midclavicular line. Normal rate. Regular rhythm. Normal S1. Normal S2.      Murmurs: There is a systolic murmur. There is a diastolic murmur.      No gallop. No click. No rub.   Pulses:     Intact distal pulses. No decreased pulses.   Edema:     Peripheral edema absent.   Abdominal:      General: Bowel sounds are normal. There is no distension.      Palpations: Abdomen is soft.      Tenderness: There is no abdominal tenderness.   Musculoskeletal: Normal range of motion.         General: No tenderness or deformity.      Cervical back: Normal range of motion and neck supple. Skin:     General: Skin is warm and dry.      " Capillary Refill: Capillary refill takes more than 3 seconds.      Coloration: Skin is cyanotic. Skin is not pale.      Findings: No erythema or rash.   Neurological:      General: No focal deficit present.      Mental Status: Alert, oriented to person, place, and time and oriented to person, place and time.   Psychiatric:         Attention and Perception: Attention and perception normal.         Mood and Affect: Mood and affect normal.         Speech: Speech normal.         Behavior: Behavior normal.         Thought Content: Thought content normal.         Cognition and Memory: Cognition and memory normal.         Judgment: Judgment normal.        Result Review :   The following data was reviewed by: JAMIL Alvarez on 1/18/2023:  Common labs    Common Labs 7/5/22 7/5/22 8/9/22 8/9/22    1240 1240 1353 1353   Glucose  98  69   BUN  11  13   Creatinine  0.86  1.13 (A)   Sodium  139  141   Potassium  4.1  3.4 (A)   Chloride  103  103   Calcium  9.5  9.8   WBC 9.14  9.75    Hemoglobin 13.6  16.5 (A)    Hematocrit 42.2  50.1 (A)    Platelets 312  312    (A) Abnormal value            Data reviewed: Cardiology studies 2d echo 2/23/22 and 7/6/22    ECG 12 Lead    Date/Time: 1/18/2023 1:32 PM  Performed by: Carol Rashid APRN  Authorized by: Carol Rashid APRN   Comparison: compared with previous ECG from 7/5/2022  Rhythm: sinus rhythm  Rate: normal  BPM: 68  T inversion: II, III, aVF, V3, V4, V5 and V6    Clinical impression: abnormal EKG              Assessment and Plan    Diagnoses and all orders for this visit:    1. Chronic right-sided heart failure (HCC)-NYHA class III.  Stage C.  Compensated. Reviewed signs and symptoms of CHF and what to report with the patient. Patient instructed to restrict sodium and weigh daily. Report weight gain of greater than 2 lbs overnight or 5 lbs in 1 week. Pt verbalized understanding of instructions and plan of care.  Continue metoprolol tartrate and Lasix.    2.  Nonrheumatic tricuspid valve regurgitation (Primary)- mild on 2D echo 7/6/2022.    3. Primary hypertension-blood pressure is borderline in office today. Increase losartan to 50 mg daily. Continue metoprolol tartrate and HCTZ.  Monitor and record daily blood pressure. Report readings consistently higher than 130/80 or consistently lower than 100/60.     4. Mixed hyperlipidemia- management per PCP.  Continue atorvastatin.    5. Hyperthyroidism-patient follows with endocrinology.  Stable.    6. Chronic obstructive pulmonary disease, unspecified COPD type (HCC)-stable on room air. Pt is following with pulmonology.     7. Tobacco abuse- Lita Sparks  reports that she has been smoking cigarettes. She started smoking about 48 years ago. She has a 11.75 pack-year smoking history. She has never used smokeless tobacco.. I have educated her on the risk of diseases from using tobacco products such as cancer, COPD and heart disease. I advised her to quit and she is not willing to quit. I spent 3  minutes counseling the patient.    8. Pulmonary hypertension (HCC)-moderate to severe on 2D echo 7/6/2022.  Likely group 3 related to chronic lung disease.  Patient is following with Dr. Hall with pulmonology.  Stable on room air.    9. OSCAR (obstructive sleep apnea)- sleep study pending.     Follow Up   Return in about 3 months (around 4/18/2023) for Next scheduled follow up.  Patient was given instructions and counseling regarding her condition or for health maintenance advice. Please see specific information pulled into the AVS if appropriate.

## 2023-02-08 ENCOUNTER — TELEPHONE (OUTPATIENT)
Dept: PULMONOLOGY | Facility: CLINIC | Age: 63
End: 2023-02-08
Payer: MEDICARE

## 2023-02-08 ENCOUNTER — TELEPHONE (OUTPATIENT)
Dept: CARDIOLOGY | Facility: CLINIC | Age: 63
End: 2023-02-08
Payer: MEDICARE

## 2023-02-08 NOTE — TELEPHONE ENCOUNTER
Patient is needing cardiac clearance for a right tsa scheduled for 02/13/23. LOV was 01/18/23. Patient is on Plavix 75 mg.    Please advise.    Thank you  WF

## 2023-02-08 NOTE — TELEPHONE ENCOUNTER
Surgical clearance letter request under media. Patient needs surgical clearance letter.  Next office visit 3/14/2023

## 2023-02-23 ENCOUNTER — TELEPHONE (OUTPATIENT)
Dept: VASCULAR SURGERY | Facility: CLINIC | Age: 63
End: 2023-02-23
Payer: MEDICARE

## 2023-02-24 ENCOUNTER — OFFICE VISIT (OUTPATIENT)
Dept: VASCULAR SURGERY | Facility: CLINIC | Age: 63
End: 2023-02-24
Payer: MEDICARE

## 2023-02-24 VITALS
DIASTOLIC BLOOD PRESSURE: 82 MMHG | BODY MASS INDEX: 28.32 KG/M2 | WEIGHT: 150 LBS | SYSTOLIC BLOOD PRESSURE: 138 MMHG | HEIGHT: 61 IN | HEART RATE: 80 BPM

## 2023-02-24 DIAGNOSIS — I83.12 VARICOSE VEINS OF BOTH LOWER EXTREMITIES WITH INFLAMMATION: ICD-10-CM

## 2023-02-24 DIAGNOSIS — I10 ESSENTIAL HYPERTENSION: ICD-10-CM

## 2023-02-24 DIAGNOSIS — I87.2 VENOUS INSUFFICIENCY: Primary | ICD-10-CM

## 2023-02-24 DIAGNOSIS — I83.11 VARICOSE VEINS OF BOTH LOWER EXTREMITIES WITH INFLAMMATION: ICD-10-CM

## 2023-02-24 DIAGNOSIS — E78.2 MIXED HYPERLIPIDEMIA: ICD-10-CM

## 2023-02-24 DIAGNOSIS — I87.2 EDEMA OF BOTH LOWER EXTREMITIES DUE TO PERIPHERAL VENOUS INSUFFICIENCY: ICD-10-CM

## 2023-02-24 PROCEDURE — 99213 OFFICE O/P EST LOW 20 MIN: CPT | Performed by: SURGERY

## 2023-02-24 NOTE — PROGRESS NOTES
02/24/2023      Ruma Jenkins, JAMIL  [address] [facility] [address]        Lita Sparks  1960    Chief Complaint   Patient presents with   • Follow-up     6 month follow up. Last seen 8/19/22. Patient denies any changes in legs, but does complain of fingers and toes turning purple.        Dear Ruma Jenkins APRN:    HPI    I had the pleasure of seeing your patient in the office today for follow up.  As you recall, the patient is a 62 y.o. female who we are currently following for lower extremity venous insufficiency.  She has previously undergone bilateral lower extremity saphenous vein radiofrequency ablations with good result.  She has continued wearing compression in the 20 to 30 mmHg range as well as using leg elevation and exercise and notes that her lower extremity edema is very well controlled.  She has minimal edema with no significant heaviness or discomfort.  She otherwise denies any lower extremity arterial symptoms with no claudication or rest pain.  She otherwise continues without any arterial symptoms with no claudication or rest pain.  She recently did have right shoulder replacement surgery and so is wearing a sling today.  She was not wearing her compression today as she has difficulty getting it on and off after the shoulder surgery.  She otherwise has no other acute complaints today.      Review of Systems   Constitutional: Negative.  Negative for activity change, appetite change, chills, diaphoresis, fatigue and fever.   HENT: Negative.  Negative for congestion, sneezing, sore throat and trouble swallowing.    Eyes: Negative.  Negative for visual disturbance.   Respiratory: Negative.  Negative for chest tightness and shortness of breath.    Cardiovascular: Positive for leg swelling (Minimal). Negative for chest pain and palpitations.   Gastrointestinal: Negative.  Negative for abdominal distention, abdominal pain, nausea and vomiting.   Endocrine: Negative.   "  Genitourinary: Negative.    Musculoskeletal: Negative.    Skin: Negative.         Previous venous congestion in the lower legs is improved after bilateral greater saphenous vein radiofrequency ablations.   Allergic/Immunologic: Negative.    Neurological: Negative.    Hematological: Negative.    Psychiatric/Behavioral: Negative.        /82   Pulse 80   Ht 154.9 cm (61\")   Wt 68 kg (150 lb)   LMP  (LMP Unknown)   BMI 28.34 kg/m²   Physical Exam  Vitals reviewed.   Constitutional:       Appearance: Normal appearance.   HENT:      Head: Normocephalic and atraumatic.      Nose: Nose normal.      Mouth/Throat:      Mouth: Mucous membranes are moist.   Eyes:      Extraocular Movements: Extraocular movements intact.      Pupils: Pupils are equal, round, and reactive to light.   Cardiovascular:      Rate and Rhythm: Normal rate and regular rhythm.      Pulses:           Carotid pulses are 2+ on the right side and 2+ on the left side.       Radial pulses are 2+ on the right side and 2+ on the left side.        Femoral pulses are 2+ on the right side and 2+ on the left side.       Popliteal pulses are 2+ on the right side and 2+ on the left side.        Dorsalis pedis pulses are 2+ on the right side and 2+ on the left side.        Posterior tibial pulses are 2+ on the right side and 2+ on the left side.      Comments: Minimal edema of the bilateral lower extremities from ankle to knee.  She was not wearing compression today.  Pulmonary:      Effort: Pulmonary effort is normal. No respiratory distress.   Abdominal:      General: There is no distension.      Palpations: Abdomen is soft. There is no mass.      Tenderness: There is no abdominal tenderness.   Musculoskeletal:         General: Normal range of motion.      Cervical back: Normal range of motion and neck supple.      Right lower leg: Edema present.      Left lower leg: Edema present.      Comments: She did have a sling in place to the right arm after " recent shoulder replacement.   Skin:     General: Skin is warm and dry.      Capillary Refill: Capillary refill takes less than 2 seconds.   Neurological:      General: No focal deficit present.      Mental Status: She is alert and oriented to person, place, and time.   Psychiatric:         Mood and Affect: Mood normal.         Behavior: Behavior normal.         Thought Content: Thought content normal.         Judgment: Judgment normal.         DIAGNOSTIC DATA:    No results found.    Patient Active Problem List   Diagnosis   • Cough   • Esophageal reflux   • Hyperthyroidism   • Arthritis   • Pharyngoesophageal dysphagia   • Tobacco abuse   • KWAN (dyspnea on exertion)   • Vaginitis and vulvovaginitis   • Chest heaviness   • Oxygen desaturation during sleep   • Simple chronic bronchitis (HCC)   • Tobacco abuse counseling   • Hypokalemia on potassium and due for repeat labs   • Venous insufficiency   • Primary hypertension   • Mixed hyperlipidemia   • Nonrheumatic tricuspid valve regurgitation   • Chronic right-sided heart failure (HCC)   • Varicose veins of both lower extremities with inflammation   • Pulmonary hypertension (HCC)   • Stasis edema with ulcer of both lower extremities ([facility])   • COPD (chronic obstructive pulmonary disease) ([facility])   • Non-seasonal allergic rhinitis   • Dependence on supplemental oxygen   • OSCAR (obstructive sleep apnea)   • Restrictive lung disease         ICD-10-CM ICD-9-CM   1. Venous insufficiency  I87.2 459.81   2. Varicose veins of both lower extremities with inflammation  I83.11 454.1    I83.12    3. Mixed hyperlipidemia  E78.2 272.2   4. Essential hypertension  I10 401.9   5. Edema of both lower extremities due to peripheral venous insufficiency  I87.2 459.81       Lab Frequency Next Occurrence   Follow Anesthesia Guidelines / Protocol Once 06/20/2022   Obtain Informed Consent Once 06/25/2022   Provide NPO Instructions to Patient Once 06/25/2022   Chlorhexidine Skin Prep Once  06/25/2022   COVID-19,APTIMA PANTHER,PAD IN-HOUSE,NP/OP/NASAL SWAB IN UTM/VTM/SALINE/LIQUID AMIES TRANSPORT MEDIA/NP WASH OR ASPIRATE, 24 HR TAT - Swab, Nasal Cavity Once 08/16/2022   Follow Anesthesia Guidelines / Protocol Once 07/15/2022   Provide NPO Instructions to Patient Once 07/20/2022   Chlorhexidine Skin Prep Once 07/20/2022   Obtain Informed Consent Once 07/20/2022       PLAN: After thoroughly evaluating Lita Sparks, I believe the best course of action is to remain conservative from a vascular standpoint.  She has now done well after bilateral lower extremity greater saphenous vein radiofrequency ablations, most recently on the left side.  Her reflux symptoms are well controlled with minimal edema. At this point plan moving forward will be to continue with compression, leg elevation, and exercise to help reduce any residual reflux symptoms that may persist.  Otherwise I will plan to see her back in the office in 1 year for ongoing surveillance and care.  The patient is to continue taking their medications as previously discussed.   I did discuss vascular risk factors as they pertain to the progression of vascular disease including controlling hypertension, and hyperlipidemia.  These factors remain controlled on her current regimen. BMI is >= 25 and <30. (Overweight) The following options were offered after discussion;: exercise counseling/recommendations and nutrition counseling/recommendations. I did  extensively on smoking cessation, and the patient was advised of the continued risks of smoking.  I provided over 10 minutes counseling on this matter. This was all discussed in full with complete understanding.  Thank you for allowing me to participate in the care of your patient.  Please do not hesitate to call with any questions or concerns.  We will keep you aware of any further encounters with Lita Sparks.      Sincerely Yours,      Cade Ko MD

## 2023-03-06 ENCOUNTER — HOSPITAL ENCOUNTER (OUTPATIENT)
Dept: SLEEP MEDICINE | Facility: HOSPITAL | Age: 63
Discharge: HOME | End: 2023-03-06
Admitting: INTERNAL MEDICINE
Payer: MEDICARE

## 2023-03-06 VITALS
HEIGHT: 61 IN | RESPIRATION RATE: 18 BRPM | DIASTOLIC BLOOD PRESSURE: 100 MMHG | WEIGHT: 150 LBS | OXYGEN SATURATION: 93 % | HEART RATE: 99 BPM | SYSTOLIC BLOOD PRESSURE: 163 MMHG | BODY MASS INDEX: 28.32 KG/M2

## 2023-03-06 DIAGNOSIS — G47.33 OSA (OBSTRUCTIVE SLEEP APNEA): ICD-10-CM

## 2023-03-06 PROCEDURE — 95810 POLYSOM 6/> YRS 4/> PARAM: CPT | Performed by: INTERNAL MEDICINE

## 2023-03-06 PROCEDURE — 95810 POLYSOM 6/> YRS 4/> PARAM: CPT

## 2023-03-14 ENCOUNTER — OFFICE VISIT (OUTPATIENT)
Dept: PULMONOLOGY | Facility: CLINIC | Age: 63
End: 2023-03-14
Payer: MEDICARE

## 2023-03-14 VITALS
HEIGHT: 61 IN | DIASTOLIC BLOOD PRESSURE: 84 MMHG | BODY MASS INDEX: 27.38 KG/M2 | SYSTOLIC BLOOD PRESSURE: 142 MMHG | OXYGEN SATURATION: 93 % | HEART RATE: 71 BPM | WEIGHT: 145 LBS

## 2023-03-14 DIAGNOSIS — R05.3 CHRONIC COUGH: ICD-10-CM

## 2023-03-14 DIAGNOSIS — G25.81 RESTLESS LEGS SYNDROME (RLS): ICD-10-CM

## 2023-03-14 DIAGNOSIS — J98.4 RESTRICTIVE LUNG DISEASE: ICD-10-CM

## 2023-03-14 DIAGNOSIS — Z72.0 TOBACCO ABUSE: ICD-10-CM

## 2023-03-14 DIAGNOSIS — J30.89 NON-SEASONAL ALLERGIC RHINITIS, UNSPECIFIED TRIGGER: ICD-10-CM

## 2023-03-14 DIAGNOSIS — J44.9 CHRONIC OBSTRUCTIVE PULMONARY DISEASE, UNSPECIFIED COPD TYPE: Primary | ICD-10-CM

## 2023-03-14 DIAGNOSIS — G47.33 OSA (OBSTRUCTIVE SLEEP APNEA): ICD-10-CM

## 2023-03-14 DIAGNOSIS — G47.34 OXYGEN DESATURATION DURING SLEEP: ICD-10-CM

## 2023-03-14 DIAGNOSIS — R06.09 DOE (DYSPNEA ON EXERTION): ICD-10-CM

## 2023-03-14 DIAGNOSIS — Z99.81 DEPENDENCE ON SUPPLEMENTAL OXYGEN: ICD-10-CM

## 2023-03-14 PROCEDURE — 99406 BEHAV CHNG SMOKING 3-10 MIN: CPT | Performed by: INTERNAL MEDICINE

## 2023-03-14 PROCEDURE — 3079F DIAST BP 80-89 MM HG: CPT | Performed by: INTERNAL MEDICINE

## 2023-03-14 PROCEDURE — 3077F SYST BP >= 140 MM HG: CPT | Performed by: INTERNAL MEDICINE

## 2023-03-14 PROCEDURE — 99214 OFFICE O/P EST MOD 30 MIN: CPT | Performed by: INTERNAL MEDICINE

## 2023-03-14 RX ORDER — FLUTICASONE PROPIONATE 50 MCG
2 SPRAY, SUSPENSION (ML) NASAL DAILY
Qty: 16 G | Refills: 5 | Status: SHIPPED | OUTPATIENT
Start: 2023-03-14

## 2023-03-14 RX ORDER — ALBUTEROL SULFATE 90 UG/1
2 AEROSOL, METERED RESPIRATORY (INHALATION) EVERY 4 HOURS PRN
Qty: 6.7 G | Refills: 5 | Status: SHIPPED | OUTPATIENT
Start: 2023-03-14

## 2023-03-14 RX ORDER — PRAMIPEXOLE DIHYDROCHLORIDE 0.12 MG/1
0.12 TABLET ORAL 3 TIMES DAILY
Qty: 90 TABLET | Refills: 5 | Status: SHIPPED | OUTPATIENT
Start: 2023-03-14

## 2023-03-14 RX ORDER — PREDNISONE 10 MG/1
TABLET ORAL
Qty: 42 TABLET | Refills: 0 | Status: SHIPPED | OUTPATIENT
Start: 2023-03-14

## 2023-03-14 RX ORDER — FLUTICASONE PROPIONATE AND SALMETEROL 250; 50 UG/1; UG/1
1 POWDER RESPIRATORY (INHALATION) 2 TIMES DAILY
Qty: 1 EACH | Refills: 11 | Status: SHIPPED | OUTPATIENT
Start: 2023-03-14

## 2023-03-14 RX ORDER — ALBUTEROL SULFATE 2.5 MG/3ML
2.5 SOLUTION RESPIRATORY (INHALATION) EVERY 4 HOURS PRN
Qty: 150 ML | Refills: 5 | Status: SHIPPED | OUTPATIENT
Start: 2023-03-14

## 2023-03-14 NOTE — PROGRESS NOTES
[facility] OUTPATIENT PROGRESS NOTE    Patient: Lita Sparks  : 1960  Age: 62 y.o.  Date of Service: 2023    REASON FOR CLINIC VISIT:  Chief Complaint   Patient presents with   • COPD     3 month F/U       Subjective:    History of Present Illness:  Lita Sparks is a 62 y.o. female who presents to the office today to be seen for    Diagnosis Plan   1. Chronic obstructive pulmonary disease, unspecified COPD type (HCC)  Home Nebulizer    CT Chest Low Dose Follow Up Without Contrast      2. Dependence on supplemental oxygen        3. Restrictive lung disease        4. Oxygen desaturation during sleep        5. OSCAR (obstructive sleep apnea)        6. Tobacco abuse        7. Non-seasonal allergic rhinitis, unspecified trigger  fluticasone (Flonase Allergy Relief) 50 MCG/ACT nasal spray      8. KWAN (dyspnea on exertion)        9. Restless legs syndrome (RLS)        10. Chronic cough        .  Other problems per record.  Patient is a very pleasant middle aged  female who was seen in the pulmonary clinic for follow-up visit     He is an active smoker and still continues to smoke a few cigarettes a day.  She has chronic obstructive pulmonary disease and also has obstructive sleep apnea but not using any CPAP.  She is using oxygen at night but not during the daytime.  She sometimes uses oxygen during the exercise and activity when she gets short of breath.  She reportedly had a shoulder surgery last month .  She is currently using Wixela and albuterol rescue inhaler but still gets short of breath and has some wheezing at times.  She is using Astelin nasal spray and fluticasone nasal spray and loratadine for nasal allergy.  She had a recent sleep study done which showed nocturnal hypoxemia for which she needs 3 L of oxygen but she is currently using 2 L of oxygen at night.    Except her shoulder surgery she had no other recent hospitalizations and ER visit and urgent care visit  or any other acute complaints.  Overall she is stable.  She did not take any COVID-vaccine or any pneumonia or influenza vaccine.  Sleep study also shows restless leg syndrome and she did not have any medications started for this year.  She reported her legs ache and feels uncomfortable at night      PFT done today:  Not done today      Results for orders placed during the hospital encounter of 22    Full Pulmonary Function Test With Bronchodilator    Narrative  [facility] - Pulmonary Function Test    [facility]  [id number]  [phone]    Patient : Lita Sparks  MRN : [id number]  CSN : [id number]  Pulmonologist : Sina Gregory MD  Date : 2022    ______________________________________________________________________    Interpretation :  1.  Spirometry suggests a mild restrictive ventilatory defect.  There is also a decrease FEF Max and a mild decrease in midflows.  2.  There is improvement in spirometry postbronchodilator.  Postbronchodilator spirometry just reveals a mild decrease in FEF Max and otherwise is within normal limits.  3.  Lung volumes reveal a decrease in expiratory reserve volume and a mild decrease in vital capacity and otherwise are within normal limits.      Sina Gregory MD         Bronchodilator therapy: Wixela and Albuterol rescue inhaler    Smoking Status:   Social History     Tobacco Use   Smoking Status Former   • Packs/day: 0.25   • Years: 47.00   • Pack years: 11.75   • Types: Cigarettes   • Start date:    • Quit date: 2022   • Years since quittin.2   • Passive exposure: Past   Smokeless Tobacco Never     Pulm Rehab: no  Sleep: yes    Support System: lives with their family    Code Status:   There are no questions and answers to display.        Review of Systems:  A complete review of systems is performed and all other systems were reviewed and negative as note above in the HPI.  Review of Systems   Constitutional: Positive  for fatigue.   HENT: Positive for postnasal drip and sinus pressure.    Respiratory: Positive for chest tightness and shortness of breath.    Cardiovascular: Negative.    Gastrointestinal: Negative.    Endocrine: Negative.    Genitourinary: Negative.    Musculoskeletal: Positive for arthralgias and back pain.   Skin: Negative.    Allergic/Immunologic: Positive for environmental allergies.   Neurological: Negative.    Hematological: Negative.    Psychiatric/Behavioral: Negative.        CAT/ACT Score:  Not done today    Medications:  Outpatient Encounter Medications as of 3/14/2023   Medication Sig Dispense Refill   • albuterol sulfate  (90 Base) MCG/ACT inhaler Inhale 2 puffs Every 4 (Four) Hours As Needed for Wheezing. 6.7 g 5   • amLODIPine (NORVASC) 10 MG tablet Take 1 tablet by mouth Daily.     • atorvastatin (LIPITOR) 40 MG tablet 1 tablet Every Night.     • Azelastine HCl 137 MCG/SPRAY solution 2 sprays into the nostril(s) as directed by provider 2 (Two) Times a Day. 30 mL 5   • calcium carbonate 648 MG tablet tablet Take 1 tablet by mouth Daily.     • cloNIDine (CATAPRES) 0.1 MG tablet 1 tablet 2 (Two) Times a Day As Needed.     • clopidogrel (PLAVIX) 75 MG tablet 1 tablet Daily.     • fluticasone (Flonase Allergy Relief) 50 MCG/ACT nasal spray 2 sprays into the nostril(s) as directed by provider Daily. 16 g 5   • Fluticasone-Salmeterol (Wixela Inhub) 250-50 MCG/ACT DISKUS Inhale 1 puff 2 (Two) Times a Day. 1 each 11   • furosemide (LASIX) 40 MG tablet Take 1 tablet by mouth 2 (Two) Times a Day.     • hydroCHLOROthiazide (HYDRODIURIL) 12.5 MG tablet Take 1 tablet by mouth Daily.     • loratadine (CLARITIN) 10 MG tablet Take 1 tablet by mouth Daily. 90 tablet 3   • losartan (COZAAR) 50 MG tablet Take 1 tablet by mouth Daily. 90 tablet 3   • meloxicam (MOBIC) 15 MG tablet Take 1 tablet by mouth Daily.     • metoprolol tartrate (LOPRESSOR) 25 MG tablet Take 1 tablet by mouth 2 (Two) Times a Day. (Patient  taking differently: Take 1 tablet by mouth 2 (Two) Times a Day. Takes 2 tablets twice daily.) 60 tablet 11   • pantoprazole (PROTONIX) 40 MG EC tablet 1 tablet Daily.     • PARoxetine (PAXIL) 10 MG tablet 1 tablet Daily.     • potassium chloride ER (K-TAB) 20 MEQ tablet controlled-release ER tablet Take 1 tablet by mouth Daily.     • promethazine (PHENERGAN) 25 MG tablet 1 tablet As Needed.     • traMADol (ULTRAM) 50 MG tablet Take 1 tablet by mouth As Needed for Moderate Pain.     • vitamin D (ERGOCALCIFEROL) 1.25 MG (54359 UT) capsule capsule Take 1 capsule by mouth 1 (One) Time Per Week.     • [DISCONTINUED] albuterol sulfate  (90 Base) MCG/ACT inhaler Inhale 2 puffs Every 4 (Four) Hours As Needed for Wheezing. 6.7 g 5   • [DISCONTINUED] fluticasone (Flonase Allergy Relief) 50 MCG/ACT nasal spray 2 sprays into the nostril(s) as directed by provider Daily. 16 g 5   • [DISCONTINUED] Fluticasone-Salmeterol (Wixela Inhub) 250-50 MCG/ACT DISKUS Inhale 1 puff 2 (Two) Times a Day. 1 each 11   • albuterol (PROVENTIL) (2.5 MG/3ML) 0.083% nebulizer solution Take 2.5 mg by nebulization Every 4 (Four) Hours As Needed for Wheezing. 150 mL 5   • fluticasone (Flonase Allergy Relief) 50 MCG/ACT nasal spray 2 sprays into the nostril(s) as directed by provider Daily. 16 g 5   • pramipexole (Mirapex) 0.125 MG tablet Take 1 tablet by mouth 3 (Three) Times a Day. 90 tablet 5   • predniSONE (DELTASONE) 10 MG tablet 6 daily x 2 days, 5 daily x 2 days, 4 daily x 2 days, 3 daily x 2 days, 2 daily x 2 days, 1 daily x 2 days 42 tablet 0     No facility-administered encounter medications on file as of 3/14/2023.       Allergies:  Allergies   Allergen Reactions   • Codeine Other (See Comments)     Chest pain   • Methimazole Other (See Comments)     N/V       Immunizations:  Immunization History   Administered Date(s) Administered   • COVID-19 (MODERNA) 6-11 YRS 04/29/2021, 05/27/2021, 05/27/2022       Objective:    Vitals:  /84   " Pulse 71   Ht 154.9 cm (61\")   Wt 65.8 kg (145 lb)   LMP  (LMP Unknown)   SpO2 93%   BMI 27.40 kg/m²     Physical Exam:  General: Patient is a 62 y.o. pleasant middle aged  female. Looks stated age. Appears to be in no acute distress.  Eyes: EOMI. PERRLA. Vision intact. No scleral icterus.  Ear, Nose, Mouth and Throat: Hearing is grossly intact. No Leukoplakia, pharyngitis, stomatitis or thrush. Swollen nasal mucosa with post nasal drop.  Neck: Range of motion of neck normal. No thyromegaly or masses. Mallampati Class 3  Respiratory: Clear to auscultation bilaterally. No use of accessory muscles. Decreased breath sounds.  Cardiovascular: Normal heart sounds. Regularly regular rhythm without murmur.  Gastrointestinal: Non tender, non distended, soft. Bowel sounds positive in all four quadrants. No organomegaly.  Skin: No obvious rashes, lesions, ulcers or large amount of bruising. No edema.   Neurological: No new motor deficits. Cranial nerves appear intact.  Psychiatric: Patient is alert and oriented to person, place and time.    Chest Imaging:     Study Result  Narrative & Impression   EXAM/TECHNIQUE: CT chest without contrast     INDICATION: COPD, surveillance; J44.1-Chronic obstructive pulmonary  disease with (acute) exacerbation     COMPARISON: None available.     DLP: 204 mGy cm. Automated exposure control was also utilized to  decrease patient radiation dose.     FINDINGS:     The trachea and mainstem bronchi are clear. There is a small amount of  endobronchial debris in the RIGHT lower lobe distal airways. Mild  centrilobular emphysema. No consolidation or pleural effusion. Small  area of tree-in-bud nodularity in the RIGHT lateral lung base. No  suspicious solid pulmonary nodule. A few scattered calcified granulomas  are present.     No enlarged axillary lymph nodes. A few borderline prominent  supraclavicular and mediastinal lymph nodes are present. For reference,  15 mm paratracheal node on " image 44. Main pulmonary artery is mildly  dilated measuring 3.2 cm diameter. Thoracic aorta is nonaneurysmal and  contains mild atherosclerotic calcification. Mild coronary  calcification. No pericardial effusion.     No large thyroid nodule. No acute chest wall soft tissue abnormality.  Prior cholecystectomy with mild prominence of the biliary tree likely  representing reservoir phenomenon. No acute osseous finding.     IMPRESSION:     1.  Mild emphysema.      2.  Small focus of tree-in-bud nodularity in the RIGHT lateral lung  base. This likely represents a subclinical infectious or inflammatory  process.     3.  No suspicious solid isolated pulmonary nodule.     4.  Mild dilatation of main pulmonary artery, which can indicate  pulmonary hypertension.     5.  Borderline prominent mediastinal and supraclavicular lymph nodes,  likely reactive.    This report was finalized on 12/15/2022 16:54 by Dr. Nas Velazquez MD.     SLEEP STUDY REPORT     REFERRING PHYSICIAN:  Lakshmi Hall MD     HISTORY OF PRESENT ILLNESS:  The patient is a 62-year-old female referred by Dr. Hall for an in lab polysomnogram.  She has a history of possible sleep apnea by prior polysomnogram but has never been on CPAP.  She is on nocturnal oxygen therapy.  She does complain of excessive daytime somnolence with an Waddell score of 11.  She does awaken with a dry mouth, gasping for air, and coughing or choking at times.  She has been told that she moves or jerks during sleep and also complains of a sensation of discomfort in her legs with an urge to move them.  She does have a difficult time maintaining sleep.  Her BMI is 28.3 with a neck circumference of 14 inches.  Her medical history is significant for restrictive lung disease, simple chronic bronchitis, nocturnal hypoxemia, gastroesophageal reflux disease, hyperlipidemia, hypertension, and nicotine dependence.     FINDINGS ON STUDY:  The patient underwent an in lab polysomnogram on  March 6, 2023.  Sleep was monitored in accordance with AASM guidelines.  In addition the recording included oral/nasal airflow, chest and abdominal respiratory effort, nasal pressure, single-channel EKG, submental EMG, bilateral tibialis EMG, and oxygen saturation via pulse oximetry.  The total recording time was 382.2 minutes with a total sleep time of 221.2 minutes with a resultant sleep efficiency of 57.9%.  The patient did have a prolonged sleep latency of 94.5 minutes.  She spent 3.8% of the total sleep time in stage N1 sleep, 63.2% in stage N2 sleep, 17.2% in stage N3 sleep, and 15.8% in stage REM sleep.  Snoring was noted but no obstructive sleep apnea was noted with an AHI of 0.0.  She did have O2 desaturations to as low as 87% and was placed on nocturnal oxygen therapy during the study initially at 2 L/min but subsequently required oxygen at 3 L/min.  No significant periodic limb movements of sleep were noted.  Her pulse ranged from 50-85 with a mean pulse of 68.     IMPRESSION:    1.  Nocturnal hypoxemia with snoring but without evidence of sleep apnea.  2.  Hypersomnolence with prolonged sleep latency and poor sleep efficiency.  3..  Symptoms suggestive of restless leg syndrome.  No significant periodic limb movements of sleep were noted on the polysomnogram.     Recommendation:  1.  Would recommend she continue nocturnal oxygen therapy and she may require oxygen at 3 L/min to maintain adequate O2 sats while asleep.  2.  Would recommend further evaluation regarding possible sleep hygiene issues which could contribute to her poor sleep efficiency and prolonged sleep latency.  3.  Further evaluation the patient's possible restless leg syndrome could be based on the severity of such symptoms.  4.  The patient should not drive if drowsy.     Sina Gregory MD  03/09/23  20:18 CST     Assessment:  1. Chronic obstructive pulmonary disease, unspecified COPD type (HCC)    2. Dependence on supplemental  oxygen    3. Restrictive lung disease    4. Oxygen desaturation during sleep    5. OSCAR (obstructive sleep apnea)    6. Tobacco abuse    7. Non-seasonal allergic rhinitis, unspecified trigger    8. KWAN (dyspnea on exertion)    9. Restless legs syndrome (RLS)    10. Chronic cough        Plan/Recommendations:    1.  I reviewed the last CT scan of the chest done in December 2022.  This did not show any lung nodule.  She had emphysematous change and mild pulmonary artery dilatation.  She also had tree-in-bud appearance in the right lung.  Explicit results to the patient.  Due to her current smoking history she will need annual CT screening of the chest and a follow-up CT scan is ordered in 6 months time.  2.  I explained to her about the current sleep study results.  She is currently using 2 L of oxygen.  It seems she does not need any CPAP.  Sleep hygiene was discussed and oxygen is increased to 3 L as recommended in the sleep study.  She also has restless leg syndrome noted for which I started her on Mirapex.  3.  Patient is also having allergy problems and will be getting Astelin nasal spray fluticasone nasal spray and loratadine.  4.  Patient has chronic obstructive pulmonary disease and probably had mild exacerbation we will monitor some ongoing wheezing.  She will be getting a course of steroid taper continues on Wixela and albuterol rescue inhaler and she was also started on albuterol nebulizer twice a day.  All prescription was sent to the pharmacy and medication refills were done.  5. Lita MERINO Bridger  reports that she quit smoking about 3 months ago. Her smoking use included cigarettes. She started smoking about 48 years ago. She has a 11.75 pack-year smoking history. She has been exposed to tobacco smoke. She has never used smokeless tobacco.. I have educated her on the risk of diseases from using tobacco products such as cancer, COPD and heart disease.   I advised her to quit and she is willing to quit. We  have discussed the following method/s for tobacco cessation:  Counseling.  Together we have set a quit date for 2 weeks from today.  She will follow up with me in 6 months or sooner to check on her progress.I spent 7 minutes counseling the patient.  6.  Patient does not want to be vaccinated.  She will continue follow-up with the primary care provider.  She had recent shoulder surgery done.  She will need to return to pulmonary clinic for follow-up visit in 6 months or earlier if needed.    Follow up:  6 Months    Time Spent:  30 minutes    I appreciate the opportunity of participating in this patient's care. I would like to thank the PCP for the referral.  Please feel free to contact me with any other questions.    Lakshmi Hall MD   Pulmonologist/Intensivist     Electronically signed by: Lakshmi Hall MD, 3/14/2023 11:22 CDT

## 2023-04-18 NOTE — PROGRESS NOTES
Chief Complaint  Hypertension (3mo F/U. Increased Losartan LOV) and Congestive Heart Failure    Subjective          Lita WILBER Sparks presents to [id number] for routine follow-up. She has chronic right-sided heart failure, moderate to severe pulmonary hypertension, hypertension, hyperlipidemia, COPD, GERD, hyperthyroidism, previous cerebrovascular accident, venous insufficiency s/p right saphenous vein ablation 7/7/22, COPD, restrictive lung disease, and former tobacco use. She continues to complain of dyspnea with mild exertion. She reports chronic orthopnea requiring two pillows to sleep. She reports intermittent palpitations and dizziness. She states bilateral lower extremity edema has improved. Patient denies chest pain, syncope, PND or decreased stamina.  Patient denies any signs of bleeding.    Hypertension  This is a chronic problem. The current episode started more than 1 year ago. The problem is uncontrolled. Associated symptoms include orthopnea, palpitations and shortness of breath. Pertinent negatives include no anxiety, blurred vision, chest pain, headaches, malaise/fatigue, neck pain, peripheral edema, PND or sweats. Risk factors for coronary artery disease include post-menopausal state, dyslipidemia and smoking/tobacco exposure. Current antihypertension treatment includes diuretics and beta blockers. The current treatment provides significant improvement. Hypertensive end-organ damage includes CVA. Identifiable causes of hypertension include a thyroid problem.   Hyperlipidemia  This is a chronic problem. The current episode started more than 1 year ago. Associated symptoms include shortness of breath. Pertinent negatives include no chest pain. Current antihyperlipidemic treatment includes statins. Risk factors for coronary artery disease include post-menopausal, hypertension and dyslipidemia.     I have reviewed and confirmed the accuracy of the WILMA PARKINSON  "Rachid, APRN      Objective   Vital Signs:   /78   Pulse 85   Ht 154.9 cm (61\")   Wt 64.4 kg (142 lb)   SpO2 95%   BMI 26.83 kg/m²     Vitals and nursing note reviewed.   Constitutional:       General: Not in acute distress.     Appearance: Normal and healthy appearance. Well-developed, overweight and not in distress. Not diaphoretic.   Eyes:      General: Lids are normal.         Right eye: No discharge.         Left eye: No discharge.      Conjunctiva/sclera: Conjunctivae normal.      Pupils: Pupils are equal, round, and reactive to light.   HENT:      Head: Normocephalic and atraumatic.      Jaw: There is normal jaw occlusion.      Right Ear: External ear normal.      Left Ear: External ear normal.      Nose: Nose normal.   Neck:      Thyroid: No thyromegaly.      Vascular: No carotid bruit, JVD or JVR. JVD normal.      Trachea: Trachea normal. No tracheal deviation.   Pulmonary:      Effort: Pulmonary effort is normal. No respiratory distress.      Breath sounds: Examination of the right-middle field reveals decreased breath sounds. Examination of the left-middle field reveals decreased breath sounds. Examination of the right-lower field reveals decreased breath sounds. Examination of the left-lower field reveals decreased breath sounds. Decreased breath sounds present. No wheezing. No rhonchi. No rales.   Chest:      Chest wall: Not tender to palpatation.   Cardiovascular:      PMI at left midclavicular line. Normal rate. Regular rhythm. Normal S1. Normal S2.      Murmurs: There is a systolic murmur. There is a diastolic murmur.      No gallop. No click. No rub.   Pulses:     Intact distal pulses. No decreased pulses.   Edema:     Peripheral edema absent.   Abdominal:      General: Bowel sounds are normal. There is no distension.      Palpations: Abdomen is soft.      Tenderness: There is no abdominal tenderness.   Musculoskeletal: Normal range of motion.         General: No tenderness or deformity. "      Cervical back: Normal range of motion and neck supple. Skin:     General: Skin is warm and dry.      Capillary Refill: Capillary refill takes more than 3 seconds.      Coloration: Skin is cyanotic. Skin is not pale.      Findings: No erythema or rash.   Neurological:      General: No focal deficit present.      Mental Status: Alert, oriented to person, place, and time and oriented to person, place and time.   Psychiatric:         Attention and Perception: Attention and perception normal.         Mood and Affect: Mood and affect normal.         Speech: Speech normal.         Behavior: Behavior normal.         Thought Content: Thought content normal.         Cognition and Memory: Cognition and memory normal.         Judgment: Judgment normal.        Result Review :   The following data was reviewed by: JAMIL Alvarez on 4/19/2023:  Common labs        7/5/2022    12:40 8/9/2022    13:53   Common Labs   Glucose 98   69     BUN 11   13     Creatinine 0.86   1.13     Sodium 139   141     Potassium 4.1   3.4     Chloride 103   103     Calcium 9.5   9.8     WBC 9.14   9.75     Hemoglobin 13.6   16.5     Hematocrit 42.2   50.1     Platelets 312   312       Data reviewed: Cardiology studies 2d echo 2/23/22 and 7/6/22         Assessment and Plan    Diagnoses and all orders for this visit:    1. Chronic right-sided heart failure (HCC)-NYHA class III.  Stage C.  Compensated. Reviewed signs and symptoms of CHF and what to report with the patient. Patient instructed to restrict sodium and weigh daily. Report weight gain of greater than 2 lbs overnight or 5 lbs in 1 week. Pt verbalized understanding of instructions and plan of care.  Continue metoprolol tartrate and Lasix.    2. Nonrheumatic tricuspid valve regurgitation (Primary)- mild on 2D echo 7/6/2022.    3. Primary hypertension-blood pressure is mildly elevated in office today. Increase losartan to 100 mg daily. Continue metoprolol tartrate and HCTZ.  Monitor  and record daily blood pressure. Report readings consistently higher than 130/80 or consistently lower than 100/60.     4. Mixed hyperlipidemia- management per PCP.  Continue atorvastatin.    5. Hyperthyroidism-patient follows with endocrinology.  Stable.    6. Chronic obstructive pulmonary disease, unspecified COPD type (HCC)-stable on room air. Pt is following with pulmonology.     7. Pulmonary hypertension (HCC)-moderate to severe on 2D echo 7/6/2022.  Likely group 3 related to chronic lung disease.  Patient is following with Dr. Hall with pulmonology.  Stable on room air.    8. OSCAR (obstructive sleep apnea)- sleep study pending.     9. Dyspnea on exertion- check 2d echo.     Follow Up   Return in about 3 months (around 7/19/2023) for Next scheduled follow up.  Patient was given instructions and counseling regarding her condition or for health maintenance advice. Please see specific information pulled into the AVS if appropriate.

## 2023-04-19 ENCOUNTER — OFFICE VISIT (OUTPATIENT)
Dept: CARDIOLOGY | Facility: CLINIC | Age: 63
End: 2023-04-19
Payer: MEDICARE

## 2023-04-19 VITALS
WEIGHT: 142 LBS | HEART RATE: 85 BPM | DIASTOLIC BLOOD PRESSURE: 78 MMHG | OXYGEN SATURATION: 95 % | SYSTOLIC BLOOD PRESSURE: 132 MMHG | HEIGHT: 61 IN | BODY MASS INDEX: 26.81 KG/M2

## 2023-04-19 DIAGNOSIS — I36.1 NONRHEUMATIC TRICUSPID VALVE REGURGITATION: ICD-10-CM

## 2023-04-19 DIAGNOSIS — G47.33 OSA (OBSTRUCTIVE SLEEP APNEA): ICD-10-CM

## 2023-04-19 DIAGNOSIS — J44.9 CHRONIC OBSTRUCTIVE PULMONARY DISEASE, UNSPECIFIED COPD TYPE: ICD-10-CM

## 2023-04-19 DIAGNOSIS — E78.2 MIXED HYPERLIPIDEMIA: ICD-10-CM

## 2023-04-19 DIAGNOSIS — I27.20 PULMONARY HYPERTENSION: ICD-10-CM

## 2023-04-19 DIAGNOSIS — R06.09 DYSPNEA ON EXERTION: ICD-10-CM

## 2023-04-19 DIAGNOSIS — I50.812 CHRONIC RIGHT-SIDED HEART FAILURE: Primary | ICD-10-CM

## 2023-04-19 DIAGNOSIS — I10 PRIMARY HYPERTENSION: ICD-10-CM

## 2023-04-19 DIAGNOSIS — E05.90 HYPERTHYROIDISM: ICD-10-CM

## 2023-04-19 RX ORDER — LOSARTAN POTASSIUM 100 MG/1
100 TABLET ORAL DAILY
Qty: 90 TABLET | Refills: 3 | Status: SHIPPED | OUTPATIENT
Start: 2023-04-19

## 2023-04-19 RX ORDER — HYDROCODONE BITARTRATE AND ACETAMINOPHEN 7.5; 325 MG/1; MG/1
1 TABLET ORAL EVERY 8 HOURS PRN
COMMUNITY

## 2023-05-17 ENCOUNTER — HOSPITAL ENCOUNTER (OUTPATIENT)
Dept: CARDIOLOGY | Facility: HOSPITAL | Age: 63
Discharge: HOME | End: 2023-05-17
Payer: MEDICARE

## 2023-09-07 ENCOUNTER — HOSPITAL ENCOUNTER (OUTPATIENT)
Dept: CT IMAGING | Facility: HOSPITAL | Age: 63
Discharge: HOME | End: 2023-09-07
Payer: MEDICARE

## 2023-10-03 ENCOUNTER — TELEPHONE (OUTPATIENT)
Dept: PULMONOLOGY | Facility: CLINIC | Age: 63
End: 2023-10-03
Payer: MEDICARE

## 2023-10-06 NOTE — TELEPHONE ENCOUNTER
Tried to call patient, no answer and no voicemail.      Letter mailed to patient asking her to call regarding her overdue Chest CT or office visit.

## 2024-03-05 ENCOUNTER — TELEPHONE (OUTPATIENT)
Dept: VASCULAR SURGERY | Facility: CLINIC | Age: 64
End: 2024-03-05
Payer: MEDICARE

## 2024-03-05 NOTE — TELEPHONE ENCOUNTER
Called patient to reschedule appointment from 02/2024. Patient states she does not want to be seen anymore.

## (undated) DEVICE — CVR PROB GEN PURP W ISOSILK 6X48

## (undated) DEVICE — CATH VASC RF CLOSUREFAST 7CM 7F100CM

## (undated) DEVICE — APPL CHLORAPREP HI/LITE 26ML ORNG

## (undated) DEVICE — SPNG GZ STRL 2S 4X4 12PLY

## (undated) DEVICE — BANDAGE,GAUZE,BULKEE II,4.5"X4.1YD,STRL: Brand: MEDLINE

## (undated) DEVICE — CATH CLS/FST ENDOVENOUS REMFG 7X100CM

## (undated) DEVICE — ST TB EXT STANDARDBORE 30IN

## (undated) DEVICE — NEEDLE, QUINCKE, 20GX3.5": Brand: MEDLINE

## (undated) DEVICE — BNDG ADHS CURAD FLX/FABRC 1X3IN STRL LF

## (undated) DEVICE — BAPTIST TURNOVER KIT: Brand: MEDLINE INDUSTRIES, INC.

## (undated) DEVICE — SYR LL TP 10ML STRL

## (undated) DEVICE — STPCK 4WY ON/OFF VLV M/COLAR FIT 45PSI STRL

## (undated) DEVICE — GLV SURG SENSICARE W/ALOE PF LF 7.5 STRL

## (undated) DEVICE — GLV SURG DERMASSURE GRN LF PF 8.0

## (undated) DEVICE — INTENDED FOR TISSUE SEPARATION, AND OTHER PROCEDURES THAT REQUIRE A SHARP SURGICAL BLADE TO PUNCTURE OR CUT.: Brand: BARD-PARKER ® STAINLESS STEEL BLADES

## (undated) DEVICE — STERILE ULTRASOUND GEL, SAFEWRAP: Brand: ECOVUE

## (undated) DEVICE — BNDG ELAS ECON W/CLIP 4IN 5YD LF STRL

## (undated) DEVICE — 3M™ STERI-STRIP™ REINFORCED ADHESIVE SKIN CLOSURES, R1547, 1/2 IN X 4 IN (12 MM X 100 MM), 6 STRIPS/ENVELOPE: Brand: 3M™ STERI-STRIP™

## (undated) DEVICE — PAD MINOR UNIVERSAL: Brand: MEDLINE INDUSTRIES, INC.

## (undated) DEVICE — SHEATH INTRO MICRO 7F 11CM

## (undated) DEVICE — BNDG ELAS W/CLIP 6IN 10YD LF STRL

## (undated) DEVICE — ST INF 2NDARY 20DRP VNT/NOVNT 30IN